# Patient Record
Sex: MALE | Race: WHITE | NOT HISPANIC OR LATINO | URBAN - METROPOLITAN AREA
[De-identification: names, ages, dates, MRNs, and addresses within clinical notes are randomized per-mention and may not be internally consistent; named-entity substitution may affect disease eponyms.]

---

## 2021-01-01 ENCOUNTER — INPATIENT (INPATIENT)
Facility: HOSPITAL | Age: 66
LOS: 1 days | DRG: 871 | End: 2021-07-24
Attending: SURGERY | Admitting: SURGERY
Payer: COMMERCIAL

## 2021-01-01 ENCOUNTER — EMERGENCY (EMERGENCY)
Facility: HOSPITAL | Age: 66
LOS: 1 days | Discharge: HOME | End: 2021-01-01
Attending: STUDENT IN AN ORGANIZED HEALTH CARE EDUCATION/TRAINING PROGRAM
Payer: COMMERCIAL

## 2021-01-01 VITALS
OXYGEN SATURATION: 98 % | SYSTOLIC BLOOD PRESSURE: 161 MMHG | RESPIRATION RATE: 18 BRPM | DIASTOLIC BLOOD PRESSURE: 90 MMHG | HEART RATE: 94 BPM | TEMPERATURE: 99 F

## 2021-01-01 VITALS
HEART RATE: 109 BPM | TEMPERATURE: 101 F | SYSTOLIC BLOOD PRESSURE: 183 MMHG | RESPIRATION RATE: 16 BRPM | WEIGHT: 179.9 LBS | DIASTOLIC BLOOD PRESSURE: 107 MMHG

## 2021-01-01 VITALS
WEIGHT: 177.91 LBS | HEART RATE: 117 BPM | RESPIRATION RATE: 18 BRPM | DIASTOLIC BLOOD PRESSURE: 77 MMHG | TEMPERATURE: 99 F | HEIGHT: 73 IN | OXYGEN SATURATION: 95 % | SYSTOLIC BLOOD PRESSURE: 108 MMHG

## 2021-01-01 VITALS — HEART RATE: 140 BPM

## 2021-01-01 DIAGNOSIS — I76 SEPTIC ARTERIAL EMBOLISM: ICD-10-CM

## 2021-01-01 LAB
-  K. PNEUMONIAE GROUP: SIGNIFICANT CHANGE UP
A1C WITH ESTIMATED AVERAGE GLUCOSE RESULT: 13.4 % — HIGH (ref 4–5.6)
ACETONE SERPL-MCNC: NEGATIVE — SIGNIFICANT CHANGE UP
ALBUMIN SERPL ELPH-MCNC: 0.9 G/DL — LOW (ref 3.5–5)
ALBUMIN SERPL ELPH-MCNC: 1.1 G/DL — LOW (ref 3.5–5)
ALBUMIN SERPL ELPH-MCNC: 1.1 G/DL — LOW (ref 3.5–5)
ALBUMIN SERPL ELPH-MCNC: 1.2 G/DL — LOW (ref 3.5–5)
ALBUMIN SERPL ELPH-MCNC: 1.4 G/DL — LOW (ref 3.5–5)
ALBUMIN SERPL ELPH-MCNC: 1.6 G/DL — LOW (ref 3.5–5)
ALBUMIN SERPL ELPH-MCNC: 1.9 G/DL — LOW (ref 3.5–5)
ALP SERPL-CCNC: 140 U/L — HIGH (ref 40–120)
ALP SERPL-CCNC: 174 U/L — HIGH (ref 40–120)
ALP SERPL-CCNC: 185 U/L — HIGH (ref 40–120)
ALP SERPL-CCNC: 190 U/L — HIGH (ref 40–120)
ALP SERPL-CCNC: 191 U/L — HIGH (ref 40–120)
ALP SERPL-CCNC: 253 U/L — HIGH (ref 40–120)
ALP SERPL-CCNC: 688 U/L — HIGH (ref 40–120)
ALT FLD-CCNC: 123 U/L DA — HIGH (ref 10–60)
ALT FLD-CCNC: 133 U/L DA — HIGH (ref 10–60)
ALT FLD-CCNC: 143 U/L DA — HIGH (ref 10–60)
ALT FLD-CCNC: 2261 U/L DA — HIGH (ref 10–60)
ALT FLD-CCNC: 240 U/L DA — HIGH (ref 10–60)
ALT FLD-CCNC: 417 U/L DA — HIGH (ref 10–60)
ALT FLD-CCNC: 48 U/L DA — SIGNIFICANT CHANGE UP (ref 10–60)
AMPHET UR-MCNC: NEGATIVE — SIGNIFICANT CHANGE UP
ANION GAP SERPL CALC-SCNC: 12 MMOL/L — SIGNIFICANT CHANGE UP (ref 5–17)
ANION GAP SERPL CALC-SCNC: 19 MMOL/L — HIGH (ref 5–17)
ANION GAP SERPL CALC-SCNC: 19 MMOL/L — HIGH (ref 5–17)
ANION GAP SERPL CALC-SCNC: 22 MMOL/L — HIGH (ref 5–17)
ANION GAP SERPL CALC-SCNC: 23 MMOL/L — HIGH (ref 5–17)
ANION GAP SERPL CALC-SCNC: 29 MMOL/L — HIGH (ref 5–17)
ANION GAP SERPL CALC-SCNC: 33 MMOL/L — HIGH (ref 5–17)
ANION GAP SERPL CALC-SCNC: 8 MMOL/L — SIGNIFICANT CHANGE UP (ref 5–17)
APPEARANCE UR: CLEAR — SIGNIFICANT CHANGE UP
APTT BLD: 30.2 SEC — SIGNIFICANT CHANGE UP (ref 27.5–35.5)
APTT BLD: 30.5 SEC — SIGNIFICANT CHANGE UP (ref 27.5–35.5)
AST SERPL-CCNC: 1872 U/L — HIGH (ref 10–40)
AST SERPL-CCNC: 264 U/L — HIGH (ref 10–40)
AST SERPL-CCNC: 267 U/L — HIGH (ref 10–40)
AST SERPL-CCNC: 285 U/L — HIGH (ref 10–40)
AST SERPL-CCNC: 63 U/L — HIGH (ref 10–40)
AST SERPL-CCNC: 803 U/L — HIGH (ref 10–40)
AST SERPL-CCNC: HIGH U/L (ref 10–40)
B PERT DNA SPEC QL NAA+PROBE: SIGNIFICANT CHANGE UP
BARBITURATES UR SCN-MCNC: NEGATIVE — SIGNIFICANT CHANGE UP
BASE EXCESS BLDA CALC-SCNC: -15.5 MMOL/L — LOW (ref -2–3)
BASE EXCESS BLDA CALC-SCNC: -16.1 MMOL/L — LOW (ref -2–3)
BASE EXCESS BLDA CALC-SCNC: -17.4 MMOL/L — LOW (ref -2–3)
BASE EXCESS BLDA CALC-SCNC: -19.4 MMOL/L — LOW (ref -2–3)
BASE EXCESS BLDA CALC-SCNC: SIGNIFICANT CHANGE UP MMOL/L (ref -2–3)
BASE EXCESS BLDA CALC-SCNC: SIGNIFICANT CHANGE UP MMOL/L (ref -2–3)
BASOPHILS # BLD AUTO: 0 K/UL — SIGNIFICANT CHANGE UP (ref 0–0.2)
BASOPHILS # BLD AUTO: 0 K/UL — SIGNIFICANT CHANGE UP (ref 0–0.2)
BASOPHILS # BLD AUTO: 0.03 K/UL — SIGNIFICANT CHANGE UP (ref 0–0.2)
BASOPHILS NFR BLD AUTO: 0 % — SIGNIFICANT CHANGE UP (ref 0–2)
BASOPHILS NFR BLD AUTO: 0 % — SIGNIFICANT CHANGE UP (ref 0–2)
BASOPHILS NFR BLD AUTO: 0.8 % — SIGNIFICANT CHANGE UP (ref 0–2)
BENZODIAZ UR-MCNC: NEGATIVE — SIGNIFICANT CHANGE UP
BILIRUB DIRECT SERPL-MCNC: 1.9 MG/DL — HIGH (ref 0–0.2)
BILIRUB SERPL-MCNC: 1.5 MG/DL — HIGH (ref 0.2–1.2)
BILIRUB SERPL-MCNC: 1.7 MG/DL — HIGH (ref 0.2–1.2)
BILIRUB SERPL-MCNC: 1.8 MG/DL — HIGH (ref 0.2–1.2)
BILIRUB SERPL-MCNC: 1.9 MG/DL — HIGH (ref 0.2–1.2)
BILIRUB SERPL-MCNC: 2.4 MG/DL — HIGH (ref 0.2–1.2)
BILIRUB SERPL-MCNC: 2.5 MG/DL — HIGH (ref 0.2–1.2)
BILIRUB SERPL-MCNC: 3 MG/DL — HIGH (ref 0.2–1.2)
BILIRUB UR-MCNC: ABNORMAL
BLD GP AB SCN SERPL QL: SIGNIFICANT CHANGE UP
BLOOD GAS COMMENTS ARTERIAL: SIGNIFICANT CHANGE UP
BUN SERPL-MCNC: 16 MG/DL — SIGNIFICANT CHANGE UP (ref 7–18)
BUN SERPL-MCNC: 17 MG/DL — SIGNIFICANT CHANGE UP (ref 7–18)
BUN SERPL-MCNC: 40 MG/DL — HIGH (ref 7–18)
BUN SERPL-MCNC: 47 MG/DL — HIGH (ref 7–18)
BUN SERPL-MCNC: 52 MG/DL — HIGH (ref 7–18)
BUN SERPL-MCNC: 57 MG/DL — HIGH (ref 7–18)
BUN SERPL-MCNC: 57 MG/DL — HIGH (ref 7–18)
BUN SERPL-MCNC: 59 MG/DL — HIGH (ref 7–18)
C PNEUM DNA SPEC QL NAA+PROBE: SIGNIFICANT CHANGE UP
CALCIUM SERPL-MCNC: 6.9 MG/DL — LOW (ref 8.4–10.5)
CALCIUM SERPL-MCNC: 7.5 MG/DL — LOW (ref 8.4–10.5)
CALCIUM SERPL-MCNC: 7.7 MG/DL — LOW (ref 8.4–10.5)
CALCIUM SERPL-MCNC: 7.8 MG/DL — LOW (ref 8.4–10.5)
CALCIUM SERPL-MCNC: 7.8 MG/DL — LOW (ref 8.4–10.5)
CALCIUM SERPL-MCNC: 7.9 MG/DL — LOW (ref 8.4–10.5)
CALCIUM SERPL-MCNC: 8.1 MG/DL — LOW (ref 8.4–10.5)
CALCIUM SERPL-MCNC: 8.3 MG/DL — LOW (ref 8.4–10.5)
CHLORIDE SERPL-SCNC: 90 MMOL/L — LOW (ref 96–108)
CHLORIDE SERPL-SCNC: 92 MMOL/L — LOW (ref 96–108)
CHLORIDE SERPL-SCNC: 92 MMOL/L — LOW (ref 96–108)
CHLORIDE SERPL-SCNC: 94 MMOL/L — LOW (ref 96–108)
CHLORIDE SERPL-SCNC: 96 MMOL/L — SIGNIFICANT CHANGE UP (ref 96–108)
CHLORIDE SERPL-SCNC: 98 MMOL/L — SIGNIFICANT CHANGE UP (ref 96–108)
CHLORIDE SERPL-SCNC: 98 MMOL/L — SIGNIFICANT CHANGE UP (ref 96–108)
CHLORIDE SERPL-SCNC: 99 MMOL/L — SIGNIFICANT CHANGE UP (ref 96–108)
CHOLEST SERPL-MCNC: 68 MG/DL — SIGNIFICANT CHANGE UP
CK SERPL-CCNC: 53 U/L — SIGNIFICANT CHANGE UP (ref 35–232)
CO2 SERPL-SCNC: 11 MMOL/L — LOW (ref 22–31)
CO2 SERPL-SCNC: 16 MMOL/L — LOW (ref 22–31)
CO2 SERPL-SCNC: 17 MMOL/L — LOW (ref 22–31)
CO2 SERPL-SCNC: 17 MMOL/L — LOW (ref 22–31)
CO2 SERPL-SCNC: 19 MMOL/L — LOW (ref 22–31)
CO2 SERPL-SCNC: 24 MMOL/L — SIGNIFICANT CHANGE UP (ref 22–31)
CO2 SERPL-SCNC: 25 MMOL/L — SIGNIFICANT CHANGE UP (ref 22–31)
CO2 SERPL-SCNC: 9 MMOL/L — CRITICAL LOW (ref 22–31)
COCAINE METAB.OTHER UR-MCNC: NEGATIVE — SIGNIFICANT CHANGE UP
COLOR SPEC: ABNORMAL
COVID-19 SPIKE DOMAIN AB INTERP: POSITIVE
COVID-19 SPIKE DOMAIN ANTIBODY RESULT: >250 U/ML — HIGH
CREAT SERPL-MCNC: 0.98 MG/DL — SIGNIFICANT CHANGE UP (ref 0.5–1.3)
CREAT SERPL-MCNC: 1 MG/DL — SIGNIFICANT CHANGE UP (ref 0.5–1.3)
CREAT SERPL-MCNC: 2.05 MG/DL — HIGH (ref 0.5–1.3)
CREAT SERPL-MCNC: 2.17 MG/DL — HIGH (ref 0.5–1.3)
CREAT SERPL-MCNC: 2.27 MG/DL — HIGH (ref 0.5–1.3)
CREAT SERPL-MCNC: 2.71 MG/DL — HIGH (ref 0.5–1.3)
CREAT SERPL-MCNC: 3.15 MG/DL — HIGH (ref 0.5–1.3)
CREAT SERPL-MCNC: 4.04 MG/DL — HIGH (ref 0.5–1.3)
CRP SERPL-MCNC: 264 MG/L — HIGH
D DIMER BLD IA.RAPID-MCNC: 1205 NG/ML DDU — HIGH
DIFF PNL FLD: ABNORMAL
EOSINOPHIL # BLD AUTO: 0 K/UL — SIGNIFICANT CHANGE UP (ref 0–0.5)
EOSINOPHIL NFR BLD AUTO: 0 % — SIGNIFICANT CHANGE UP (ref 0–6)
ESTIMATED AVERAGE GLUCOSE: 338 MG/DL — HIGH (ref 68–114)
FLUAV H1 2009 PAND RNA SPEC QL NAA+PROBE: SIGNIFICANT CHANGE UP
FLUAV H1 RNA SPEC QL NAA+PROBE: SIGNIFICANT CHANGE UP
FLUAV H3 RNA SPEC QL NAA+PROBE: SIGNIFICANT CHANGE UP
FLUAV SUBTYP SPEC NAA+PROBE: SIGNIFICANT CHANGE UP
FLUBV RNA SPEC QL NAA+PROBE: SIGNIFICANT CHANGE UP
GLUCOSE SERPL-MCNC: 104 MG/DL — HIGH (ref 70–99)
GLUCOSE SERPL-MCNC: 177 MG/DL — HIGH (ref 70–99)
GLUCOSE SERPL-MCNC: 265 MG/DL — HIGH (ref 70–99)
GLUCOSE SERPL-MCNC: 326 MG/DL — HIGH (ref 70–99)
GLUCOSE SERPL-MCNC: 359 MG/DL — HIGH (ref 70–99)
GLUCOSE SERPL-MCNC: 415 MG/DL — HIGH (ref 70–99)
GLUCOSE SERPL-MCNC: 440 MG/DL — HIGH (ref 70–99)
GLUCOSE SERPL-MCNC: 55 MG/DL — LOW (ref 70–99)
GLUCOSE UR QL: 100 MG/DL
GRAM STN FLD: SIGNIFICANT CHANGE UP
GRAM STN FLD: SIGNIFICANT CHANGE UP
HADV DNA SPEC QL NAA+PROBE: SIGNIFICANT CHANGE UP
HAV IGM SER-ACNC: SIGNIFICANT CHANGE UP
HBV CORE IGM SER-ACNC: SIGNIFICANT CHANGE UP
HBV SURFACE AG SER-ACNC: SIGNIFICANT CHANGE UP
HCO3 BLDA-SCNC: 10 MMOL/L — CRITICAL LOW (ref 21–28)
HCO3 BLDA-SCNC: 11 MMOL/L — LOW (ref 21–28)
HCO3 BLDA-SCNC: 13 MMOL/L — LOW (ref 21–28)
HCO3 BLDA-SCNC: 13 MMOL/L — LOW (ref 21–28)
HCO3 BLDA-SCNC: SIGNIFICANT CHANGE UP MMOL/L (ref 21–28)
HCO3 BLDA-SCNC: SIGNIFICANT CHANGE UP MMOL/L (ref 21–28)
HCOV PNL SPEC NAA+PROBE: SIGNIFICANT CHANGE UP
HCT VFR BLD CALC: 40.4 % — SIGNIFICANT CHANGE UP (ref 39–50)
HCT VFR BLD CALC: 40.7 % — SIGNIFICANT CHANGE UP (ref 39–50)
HCT VFR BLD CALC: 43.2 % — SIGNIFICANT CHANGE UP (ref 39–50)
HCT VFR BLD CALC: 43.5 % — SIGNIFICANT CHANGE UP (ref 39–50)
HCT VFR BLD CALC: 46.3 % — SIGNIFICANT CHANGE UP (ref 39–50)
HCT VFR BLD CALC: 47.6 % — SIGNIFICANT CHANGE UP (ref 39–50)
HCT VFR BLD CALC: 48.4 % — SIGNIFICANT CHANGE UP (ref 39–50)
HCV AB S/CO SERPL IA: 0.09 S/CO — SIGNIFICANT CHANGE UP (ref 0–0.99)
HCV AB SERPL-IMP: SIGNIFICANT CHANGE UP
HDLC SERPL-MCNC: 12 MG/DL — LOW
HGB BLD-MCNC: 12.9 G/DL — LOW (ref 13–17)
HGB BLD-MCNC: 13.5 G/DL — SIGNIFICANT CHANGE UP (ref 13–17)
HGB BLD-MCNC: 13.5 G/DL — SIGNIFICANT CHANGE UP (ref 13–17)
HGB BLD-MCNC: 14 G/DL — SIGNIFICANT CHANGE UP (ref 13–17)
HGB BLD-MCNC: 14.2 G/DL — SIGNIFICANT CHANGE UP (ref 13–17)
HGB BLD-MCNC: 14.7 G/DL — SIGNIFICANT CHANGE UP (ref 13–17)
HGB BLD-MCNC: 14.7 G/DL — SIGNIFICANT CHANGE UP (ref 13–17)
HMPV RNA SPEC QL NAA+PROBE: SIGNIFICANT CHANGE UP
HOROWITZ INDEX BLDA+IHG-RTO: 100 — SIGNIFICANT CHANGE UP
HOROWITZ INDEX BLDA+IHG-RTO: 35 — SIGNIFICANT CHANGE UP
HOROWITZ INDEX BLDA+IHG-RTO: SIGNIFICANT CHANGE UP
HPIV1 RNA SPEC QL NAA+PROBE: SIGNIFICANT CHANGE UP
HPIV2 RNA SPEC QL NAA+PROBE: SIGNIFICANT CHANGE UP
HPIV3 RNA SPEC QL NAA+PROBE: SIGNIFICANT CHANGE UP
HPIV4 RNA SPEC QL NAA+PROBE: SIGNIFICANT CHANGE UP
IMM GRANULOCYTES NFR BLD AUTO: 0 % — SIGNIFICANT CHANGE UP (ref 0–1.5)
INR BLD: 1.43 RATIO — HIGH (ref 0.88–1.16)
INR BLD: 1.55 RATIO — HIGH (ref 0.88–1.16)
KETONES UR-MCNC: ABNORMAL
LACTATE SERPL-SCNC: 10.9 MMOL/L — CRITICAL HIGH (ref 0.7–2)
LACTATE SERPL-SCNC: 11.5 MMOL/L — CRITICAL HIGH (ref 0.7–2)
LACTATE SERPL-SCNC: 11.6 MMOL/L — CRITICAL HIGH (ref 0.7–2)
LACTATE SERPL-SCNC: 14.5 MMOL/L — CRITICAL HIGH (ref 0.7–2)
LACTATE SERPL-SCNC: 14.9 MMOL/L — CRITICAL HIGH (ref 0.7–2)
LACTATE SERPL-SCNC: 20.9 MMOL/L — CRITICAL HIGH (ref 0.7–2)
LACTATE SERPL-SCNC: 24.8 MMOL/L — CRITICAL HIGH (ref 0.7–2)
LEGIONELLA AG UR QL: NEGATIVE — SIGNIFICANT CHANGE UP
LEUKOCYTE ESTERASE UR-ACNC: ABNORMAL
LIPID PNL WITH DIRECT LDL SERPL: 29 MG/DL — SIGNIFICANT CHANGE UP
LYMPHOCYTES # BLD AUTO: 0.32 K/UL — LOW (ref 1–3.3)
LYMPHOCYTES # BLD AUTO: 0.33 K/UL — LOW (ref 1–3.3)
LYMPHOCYTES # BLD AUTO: 0.33 K/UL — LOW (ref 1–3.3)
LYMPHOCYTES # BLD AUTO: 2 % — LOW (ref 13–44)
LYMPHOCYTES # BLD AUTO: 3 % — LOW (ref 13–44)
LYMPHOCYTES # BLD AUTO: 8.7 % — LOW (ref 13–44)
MAGNESIUM SERPL-MCNC: 1.8 MG/DL — SIGNIFICANT CHANGE UP (ref 1.6–2.6)
MAGNESIUM SERPL-MCNC: 2.1 MG/DL — SIGNIFICANT CHANGE UP (ref 1.6–2.6)
MAGNESIUM SERPL-MCNC: 2.2 MG/DL — SIGNIFICANT CHANGE UP (ref 1.6–2.6)
MAGNESIUM SERPL-MCNC: 2.4 MG/DL — SIGNIFICANT CHANGE UP (ref 1.6–2.6)
MAGNESIUM SERPL-MCNC: 2.7 MG/DL — HIGH (ref 1.6–2.6)
MAGNESIUM SERPL-MCNC: 3.2 MG/DL — HIGH (ref 1.6–2.6)
MANUAL SMEAR VERIFICATION: SIGNIFICANT CHANGE UP
MCHC RBC-ENTMCNC: 27.6 PG — SIGNIFICANT CHANGE UP (ref 27–34)
MCHC RBC-ENTMCNC: 27.7 PG — SIGNIFICANT CHANGE UP (ref 27–34)
MCHC RBC-ENTMCNC: 27.7 PG — SIGNIFICANT CHANGE UP (ref 27–34)
MCHC RBC-ENTMCNC: 27.9 PG — SIGNIFICANT CHANGE UP (ref 27–34)
MCHC RBC-ENTMCNC: 28.1 PG — SIGNIFICANT CHANGE UP (ref 27–34)
MCHC RBC-ENTMCNC: 29.2 GM/DL — LOW (ref 32–36)
MCHC RBC-ENTMCNC: 30.4 GM/DL — LOW (ref 32–36)
MCHC RBC-ENTMCNC: 30.9 GM/DL — LOW (ref 32–36)
MCHC RBC-ENTMCNC: 31.9 GM/DL — LOW (ref 32–36)
MCHC RBC-ENTMCNC: 32.2 GM/DL — SIGNIFICANT CHANGE UP (ref 32–36)
MCHC RBC-ENTMCNC: 32.9 GM/DL — SIGNIFICANT CHANGE UP (ref 32–36)
MCHC RBC-ENTMCNC: 33.2 GM/DL — SIGNIFICANT CHANGE UP (ref 32–36)
MCV RBC AUTO: 84.1 FL — SIGNIFICANT CHANGE UP (ref 80–100)
MCV RBC AUTO: 84.4 FL — SIGNIFICANT CHANGE UP (ref 80–100)
MCV RBC AUTO: 86.8 FL — SIGNIFICANT CHANGE UP (ref 80–100)
MCV RBC AUTO: 87.4 FL — SIGNIFICANT CHANGE UP (ref 80–100)
MCV RBC AUTO: 90.8 FL — SIGNIFICANT CHANGE UP (ref 80–100)
MCV RBC AUTO: 91.1 FL — SIGNIFICANT CHANGE UP (ref 80–100)
MCV RBC AUTO: 94.5 FL — SIGNIFICANT CHANGE UP (ref 80–100)
METHADONE UR-MCNC: NEGATIVE — SIGNIFICANT CHANGE UP
METHOD TYPE: SIGNIFICANT CHANGE UP
MONOCYTES # BLD AUTO: 0.23 K/UL — SIGNIFICANT CHANGE UP (ref 0–0.9)
MONOCYTES # BLD AUTO: 0.77 K/UL — SIGNIFICANT CHANGE UP (ref 0–0.9)
MONOCYTES # BLD AUTO: 0.8 K/UL — SIGNIFICANT CHANGE UP (ref 0–0.9)
MONOCYTES NFR BLD AUTO: 5 % — SIGNIFICANT CHANGE UP (ref 2–14)
MONOCYTES NFR BLD AUTO: 6.1 % — SIGNIFICANT CHANGE UP (ref 2–14)
MONOCYTES NFR BLD AUTO: 7 % — SIGNIFICANT CHANGE UP (ref 2–14)
MRSA PCR RESULT.: SIGNIFICANT CHANGE UP
NEUTROPHILS # BLD AUTO: 14.87 K/UL — HIGH (ref 1.8–7.4)
NEUTROPHILS # BLD AUTO: 3.21 K/UL — SIGNIFICANT CHANGE UP (ref 1.8–7.4)
NEUTROPHILS # BLD AUTO: 9.04 K/UL — HIGH (ref 1.8–7.4)
NEUTROPHILS NFR BLD AUTO: 82 % — HIGH (ref 43–77)
NEUTROPHILS NFR BLD AUTO: 82 % — HIGH (ref 43–77)
NEUTROPHILS NFR BLD AUTO: 84.4 % — HIGH (ref 43–77)
NITRITE UR-MCNC: POSITIVE
NON HDL CHOLESTEROL: 56 MG/DL — SIGNIFICANT CHANGE UP
NRBC # BLD: 0 /100 WBCS — SIGNIFICANT CHANGE UP (ref 0–0)
NRBC # BLD: 0 /100 — SIGNIFICANT CHANGE UP (ref 0–0)
NRBC # BLD: 1 /100 WBCS — HIGH (ref 0–0)
OPIATES UR-MCNC: POSITIVE
PCO2 BLDA: 29 MMHG — LOW (ref 35–48)
PCO2 BLDA: 31 MMHG — LOW (ref 35–48)
PCO2 BLDA: 37 MMHG — SIGNIFICANT CHANGE UP (ref 35–48)
PCO2 BLDA: 41 MMHG — SIGNIFICANT CHANGE UP (ref 35–48)
PCO2 BLDA: 42 MMHG — SIGNIFICANT CHANGE UP (ref 35–48)
PCO2 BLDA: 43 MMHG — SIGNIFICANT CHANGE UP (ref 35–48)
PCP SPEC-MCNC: SIGNIFICANT CHANGE UP
PCP UR-MCNC: NEGATIVE — SIGNIFICANT CHANGE UP
PH BLDA: 7.02 — CRITICAL LOW (ref 7.35–7.45)
PH BLDA: 7.1 — CRITICAL LOW (ref 7.35–7.45)
PH BLDA: 7.12 — CRITICAL LOW (ref 7.35–7.45)
PH BLDA: 7.15 — CRITICAL LOW (ref 7.35–7.45)
PH BLDA: <7 — SIGNIFICANT CHANGE UP (ref 7.35–7.45)
PH BLDA: <7 — SIGNIFICANT CHANGE UP (ref 7.35–7.45)
PH UR: 5 — SIGNIFICANT CHANGE UP (ref 5–8)
PHOSPHATE SERPL-MCNC: 15.3 MG/DL — SIGNIFICANT CHANGE UP (ref 2.5–4.5)
PHOSPHATE SERPL-MCNC: 4.9 MG/DL — HIGH (ref 2.5–4.5)
PHOSPHATE SERPL-MCNC: 6.9 MG/DL — HIGH (ref 2.5–4.5)
PHOSPHATE SERPL-MCNC: 8.7 MG/DL — HIGH (ref 2.5–4.5)
PLAT MORPH BLD: NORMAL — SIGNIFICANT CHANGE UP
PLATELET # BLD AUTO: 133 K/UL — LOW (ref 150–400)
PLATELET # BLD AUTO: 140 K/UL — LOW (ref 150–400)
PLATELET # BLD AUTO: 160 K/UL — SIGNIFICANT CHANGE UP (ref 150–400)
PLATELET # BLD AUTO: 171 K/UL — SIGNIFICANT CHANGE UP (ref 150–400)
PLATELET # BLD AUTO: 184 K/UL — SIGNIFICANT CHANGE UP (ref 150–400)
PLATELET # BLD AUTO: 185 K/UL — SIGNIFICANT CHANGE UP (ref 150–400)
PLATELET # BLD AUTO: 83 K/UL — LOW (ref 150–400)
PO2 BLDA: 63 MMHG — LOW (ref 83–108)
PO2 BLDA: 70 MMHG — LOW (ref 83–108)
PO2 BLDA: 72 MMHG — LOW (ref 83–108)
PO2 BLDA: 72 MMHG — LOW (ref 83–108)
PO2 BLDA: 73 MMHG — LOW (ref 83–108)
PO2 BLDA: 79 MMHG — LOW (ref 83–108)
POTASSIUM SERPL-MCNC: 3.5 MMOL/L — SIGNIFICANT CHANGE UP (ref 3.5–5.3)
POTASSIUM SERPL-MCNC: 3.6 MMOL/L — SIGNIFICANT CHANGE UP (ref 3.5–5.3)
POTASSIUM SERPL-MCNC: 3.7 MMOL/L — SIGNIFICANT CHANGE UP (ref 3.5–5.3)
POTASSIUM SERPL-MCNC: 4.1 MMOL/L — SIGNIFICANT CHANGE UP (ref 3.5–5.3)
POTASSIUM SERPL-MCNC: 4.1 MMOL/L — SIGNIFICANT CHANGE UP (ref 3.5–5.3)
POTASSIUM SERPL-MCNC: 4.2 MMOL/L — SIGNIFICANT CHANGE UP (ref 3.5–5.3)
POTASSIUM SERPL-MCNC: 4.4 MMOL/L — SIGNIFICANT CHANGE UP (ref 3.5–5.3)
POTASSIUM SERPL-MCNC: 6.6 MMOL/L — CRITICAL HIGH (ref 3.5–5.3)
POTASSIUM SERPL-SCNC: 3.5 MMOL/L — SIGNIFICANT CHANGE UP (ref 3.5–5.3)
POTASSIUM SERPL-SCNC: 3.6 MMOL/L — SIGNIFICANT CHANGE UP (ref 3.5–5.3)
POTASSIUM SERPL-SCNC: 3.7 MMOL/L — SIGNIFICANT CHANGE UP (ref 3.5–5.3)
POTASSIUM SERPL-SCNC: 4.1 MMOL/L — SIGNIFICANT CHANGE UP (ref 3.5–5.3)
POTASSIUM SERPL-SCNC: 4.1 MMOL/L — SIGNIFICANT CHANGE UP (ref 3.5–5.3)
POTASSIUM SERPL-SCNC: 4.2 MMOL/L — SIGNIFICANT CHANGE UP (ref 3.5–5.3)
POTASSIUM SERPL-SCNC: 4.4 MMOL/L — SIGNIFICANT CHANGE UP (ref 3.5–5.3)
POTASSIUM SERPL-SCNC: 6.6 MMOL/L — CRITICAL HIGH (ref 3.5–5.3)
PROT SERPL-MCNC: 4 G/DL — LOW (ref 6–8.3)
PROT SERPL-MCNC: 4.6 G/DL — LOW (ref 6–8.3)
PROT SERPL-MCNC: 4.8 G/DL — LOW (ref 6–8.3)
PROT SERPL-MCNC: 5.2 G/DL — LOW (ref 6–8.3)
PROT SERPL-MCNC: 5.6 G/DL — LOW (ref 6–8.3)
PROT SERPL-MCNC: 6.5 G/DL — SIGNIFICANT CHANGE UP (ref 6–8.3)
PROT SERPL-MCNC: 6.7 G/DL — SIGNIFICANT CHANGE UP (ref 6–8.3)
PROT UR-MCNC: 30 MG/DL
PROTHROM AB SERPL-ACNC: 16.7 SEC — HIGH (ref 10.6–13.6)
PROTHROM AB SERPL-ACNC: 18.1 SEC — HIGH (ref 10.6–13.6)
RAPID RVP RESULT: DETECTED
RBC # BLD: 4.62 M/UL — SIGNIFICANT CHANGE UP (ref 4.2–5.8)
RBC # BLD: 4.84 M/UL — SIGNIFICANT CHANGE UP (ref 4.2–5.8)
RBC # BLD: 4.9 M/UL — SIGNIFICANT CHANGE UP (ref 4.2–5.8)
RBC # BLD: 5.01 M/UL — SIGNIFICANT CHANGE UP (ref 4.2–5.8)
RBC # BLD: 5.12 M/UL — SIGNIFICANT CHANGE UP (ref 4.2–5.8)
RBC # BLD: 5.24 M/UL — SIGNIFICANT CHANGE UP (ref 4.2–5.8)
RBC # BLD: 5.31 M/UL — SIGNIFICANT CHANGE UP (ref 4.2–5.8)
RBC # FLD: 12.7 % — SIGNIFICANT CHANGE UP (ref 10.3–14.5)
RBC # FLD: 13.5 % — SIGNIFICANT CHANGE UP (ref 10.3–14.5)
RBC # FLD: 14 % — SIGNIFICANT CHANGE UP (ref 10.3–14.5)
RBC # FLD: 14.2 % — SIGNIFICANT CHANGE UP (ref 10.3–14.5)
RBC # FLD: 14.5 % — SIGNIFICANT CHANGE UP (ref 10.3–14.5)
RBC # FLD: 14.9 % — HIGH (ref 10.3–14.5)
RBC # FLD: 15.2 % — HIGH (ref 10.3–14.5)
RBC BLD AUTO: NORMAL — SIGNIFICANT CHANGE UP
RSV RNA SPEC QL NAA+PROBE: DETECTED
RV+EV RNA SPEC QL NAA+PROBE: SIGNIFICANT CHANGE UP
S AUREUS DNA NOSE QL NAA+PROBE: DETECTED
SAO2 % BLDA: 90 % — SIGNIFICANT CHANGE UP
SAO2 % BLDA: 92 % — SIGNIFICANT CHANGE UP
SAO2 % BLDA: 92 % — SIGNIFICANT CHANGE UP
SAO2 % BLDA: 93 % — SIGNIFICANT CHANGE UP
SAO2 % BLDA: 93 % — SIGNIFICANT CHANGE UP
SAO2 % BLDA: 95 % — SIGNIFICANT CHANGE UP
SARS-COV-2 IGG+IGM SERPL QL IA: >250 U/ML — HIGH
SARS-COV-2 IGG+IGM SERPL QL IA: POSITIVE
SARS-COV-2 RNA SPEC QL NAA+PROBE: SIGNIFICANT CHANGE UP
SODIUM SERPL-SCNC: 126 MMOL/L — LOW (ref 135–145)
SODIUM SERPL-SCNC: 128 MMOL/L — LOW (ref 135–145)
SODIUM SERPL-SCNC: 128 MMOL/L — LOW (ref 135–145)
SODIUM SERPL-SCNC: 129 MMOL/L — LOW (ref 135–145)
SODIUM SERPL-SCNC: 137 MMOL/L — SIGNIFICANT CHANGE UP (ref 135–145)
SODIUM SERPL-SCNC: 138 MMOL/L — SIGNIFICANT CHANGE UP (ref 135–145)
SP GR SPEC: 1.02 — SIGNIFICANT CHANGE UP (ref 1.01–1.02)
SPECIMEN SOURCE: SIGNIFICANT CHANGE UP
SPECIMEN SOURCE: SIGNIFICANT CHANGE UP
THC UR QL: NEGATIVE — SIGNIFICANT CHANGE UP
TRIGL SERPL-MCNC: 133 MG/DL — SIGNIFICANT CHANGE UP
TROPONIN I SERPL-MCNC: 0.04 NG/ML — SIGNIFICANT CHANGE UP (ref 0–0.04)
TROPONIN I SERPL-MCNC: 0.28 NG/ML — HIGH (ref 0–0.04)
TROPONIN I SERPL-MCNC: <0.015 NG/ML — SIGNIFICANT CHANGE UP (ref 0–0.04)
UROBILINOGEN FLD QL: 4
VARIANT LYMPHS # BLD: 8 % — HIGH (ref 0–6)
VIT B12 SERPL-MCNC: >2000 PG/ML — HIGH (ref 232–1245)
WBC # BLD: 11.03 K/UL — HIGH (ref 3.8–10.5)
WBC # BLD: 15.99 K/UL — HIGH (ref 3.8–10.5)
WBC # BLD: 2.66 K/UL — LOW (ref 3.8–10.5)
WBC # BLD: 3.37 K/UL — LOW (ref 3.8–10.5)
WBC # BLD: 3.8 K/UL — SIGNIFICANT CHANGE UP (ref 3.8–10.5)
WBC # BLD: 4.56 K/UL — SIGNIFICANT CHANGE UP (ref 3.8–10.5)
WBC # BLD: 8.17 K/UL — SIGNIFICANT CHANGE UP (ref 3.8–10.5)
WBC # FLD AUTO: 11.03 K/UL — HIGH (ref 3.8–10.5)
WBC # FLD AUTO: 15.99 K/UL — HIGH (ref 3.8–10.5)
WBC # FLD AUTO: 2.66 K/UL — LOW (ref 3.8–10.5)
WBC # FLD AUTO: 3.37 K/UL — LOW (ref 3.8–10.5)
WBC # FLD AUTO: 3.8 K/UL — SIGNIFICANT CHANGE UP (ref 3.8–10.5)
WBC # FLD AUTO: 4.56 K/UL — SIGNIFICANT CHANGE UP (ref 3.8–10.5)
WBC # FLD AUTO: 8.17 K/UL — SIGNIFICANT CHANGE UP (ref 3.8–10.5)

## 2021-01-01 PROCEDURE — 94003 VENT MGMT INPAT SUBQ DAY: CPT

## 2021-01-01 PROCEDURE — 71045 X-RAY EXAM CHEST 1 VIEW: CPT | Mod: 26

## 2021-01-01 PROCEDURE — C1769: CPT

## 2021-01-01 PROCEDURE — 99053 MED SERV 10PM-8AM 24 HR FAC: CPT

## 2021-01-01 PROCEDURE — 99291 CRITICAL CARE FIRST HOUR: CPT

## 2021-01-01 PROCEDURE — 80307 DRUG TEST PRSMV CHEM ANLYZR: CPT

## 2021-01-01 PROCEDURE — 87641 MR-STAPH DNA AMP PROBE: CPT

## 2021-01-01 PROCEDURE — 87186 SC STD MICRODIL/AGAR DIL: CPT

## 2021-01-01 PROCEDURE — 36415 COLL VENOUS BLD VENIPUNCTURE: CPT

## 2021-01-01 PROCEDURE — 80048 BASIC METABOLIC PNL TOTAL CA: CPT

## 2021-01-01 PROCEDURE — 74178 CT ABD&PLV WO CNTR FLWD CNTR: CPT | Mod: 26

## 2021-01-01 PROCEDURE — 71046 X-RAY EXAM CHEST 2 VIEWS: CPT

## 2021-01-01 PROCEDURE — 86769 SARS-COV-2 COVID-19 ANTIBODY: CPT

## 2021-01-01 PROCEDURE — 96375 TX/PRO/DX INJ NEW DRUG ADDON: CPT

## 2021-01-01 PROCEDURE — 49405 IMAGE CATH FLUID COLXN VISC: CPT

## 2021-01-01 PROCEDURE — 82009 KETONE BODYS QUAL: CPT

## 2021-01-01 PROCEDURE — 82962 GLUCOSE BLOOD TEST: CPT

## 2021-01-01 PROCEDURE — 93010 ELECTROCARDIOGRAM REPORT: CPT

## 2021-01-01 PROCEDURE — 71275 CT ANGIOGRAPHY CHEST: CPT | Mod: MA

## 2021-01-01 PROCEDURE — 71046 X-RAY EXAM CHEST 2 VIEWS: CPT | Mod: 26

## 2021-01-01 PROCEDURE — 87449 NOS EACH ORGANISM AG IA: CPT

## 2021-01-01 PROCEDURE — 82248 BILIRUBIN DIRECT: CPT

## 2021-01-01 PROCEDURE — 99253 IP/OBS CNSLTJ NEW/EST LOW 45: CPT

## 2021-01-01 PROCEDURE — 87635 SARS-COV-2 COVID-19 AMP PRB: CPT

## 2021-01-01 PROCEDURE — 74176 CT ABD & PELVIS W/O CONTRAST: CPT | Mod: 26,MA

## 2021-01-01 PROCEDURE — 83036 HEMOGLOBIN GLYCOSYLATED A1C: CPT

## 2021-01-01 PROCEDURE — 70450 CT HEAD/BRAIN W/O DYE: CPT | Mod: MA

## 2021-01-01 PROCEDURE — 0225U NFCT DS DNA&RNA 21 SARSCOV2: CPT

## 2021-01-01 PROCEDURE — 87538 HIV-2 PROBE&REVRSE TRNSCRIPJ: CPT

## 2021-01-01 PROCEDURE — 87086 URINE CULTURE/COLONY COUNT: CPT

## 2021-01-01 PROCEDURE — 87150 DNA/RNA AMPLIFIED PROBE: CPT

## 2021-01-01 PROCEDURE — 85610 PROTHROMBIN TIME: CPT

## 2021-01-01 PROCEDURE — 83735 ASSAY OF MAGNESIUM: CPT

## 2021-01-01 PROCEDURE — 86140 C-REACTIVE PROTEIN: CPT

## 2021-01-01 PROCEDURE — 80074 ACUTE HEPATITIS PANEL: CPT

## 2021-01-01 PROCEDURE — 80061 LIPID PANEL: CPT

## 2021-01-01 PROCEDURE — 87640 STAPH A DNA AMP PROBE: CPT

## 2021-01-01 PROCEDURE — 86900 BLOOD TYPING SEROLOGIC ABO: CPT

## 2021-01-01 PROCEDURE — 80053 COMPREHEN METABOLIC PANEL: CPT

## 2021-01-01 PROCEDURE — 85025 COMPLETE CBC W/AUTO DIFF WBC: CPT

## 2021-01-01 PROCEDURE — C1729: CPT

## 2021-01-01 PROCEDURE — 87070 CULTURE OTHR SPECIMN AEROBIC: CPT

## 2021-01-01 PROCEDURE — 87205 SMEAR GRAM STAIN: CPT

## 2021-01-01 PROCEDURE — 93005 ELECTROCARDIOGRAM TRACING: CPT

## 2021-01-01 PROCEDURE — 85379 FIBRIN DEGRADATION QUANT: CPT

## 2021-01-01 PROCEDURE — 81001 URINALYSIS AUTO W/SCOPE: CPT

## 2021-01-01 PROCEDURE — 70450 CT HEAD/BRAIN W/O DYE: CPT | Mod: 26,MA

## 2021-01-01 PROCEDURE — 74176 CT ABD & PELVIS W/O CONTRAST: CPT | Mod: MA

## 2021-01-01 PROCEDURE — 96374 THER/PROPH/DIAG INJ IV PUSH: CPT | Mod: XU

## 2021-01-01 PROCEDURE — 85730 THROMBOPLASTIN TIME PARTIAL: CPT

## 2021-01-01 PROCEDURE — 87040 BLOOD CULTURE FOR BACTERIA: CPT

## 2021-01-01 PROCEDURE — 87077 CULTURE AEROBIC IDENTIFY: CPT

## 2021-01-01 PROCEDURE — 82550 ASSAY OF CK (CPK): CPT

## 2021-01-01 PROCEDURE — 82803 BLOOD GASES ANY COMBINATION: CPT

## 2021-01-01 PROCEDURE — 82607 VITAMIN B-12: CPT

## 2021-01-01 PROCEDURE — 94002 VENT MGMT INPAT INIT DAY: CPT

## 2021-01-01 PROCEDURE — 71275 CT ANGIOGRAPHY CHEST: CPT | Mod: 26,MA

## 2021-01-01 PROCEDURE — 74178 CT ABD&PLV WO CNTR FLWD CNTR: CPT

## 2021-01-01 PROCEDURE — 85027 COMPLETE CBC AUTOMATED: CPT

## 2021-01-01 PROCEDURE — 83605 ASSAY OF LACTIC ACID: CPT

## 2021-01-01 PROCEDURE — 84484 ASSAY OF TROPONIN QUANT: CPT

## 2021-01-01 PROCEDURE — 99285 EMERGENCY DEPT VISIT HI MDM: CPT

## 2021-01-01 PROCEDURE — 84100 ASSAY OF PHOSPHORUS: CPT

## 2021-01-01 PROCEDURE — 86850 RBC ANTIBODY SCREEN: CPT

## 2021-01-01 PROCEDURE — 71045 X-RAY EXAM CHEST 1 VIEW: CPT

## 2021-01-01 PROCEDURE — 86901 BLOOD TYPING SEROLOGIC RH(D): CPT

## 2021-01-01 PROCEDURE — 99284 EMERGENCY DEPT VISIT MOD MDM: CPT | Mod: 25

## 2021-01-01 RX ORDER — DEXTROSE 50 % IN WATER 50 %
50 SYRINGE (ML) INTRAVENOUS ONCE
Refills: 0 | Status: COMPLETED | OUTPATIENT
Start: 2021-01-01 | End: 2021-01-01

## 2021-01-01 RX ORDER — DEXTROSE MONOHYDRATE, SODIUM CHLORIDE, AND POTASSIUM CHLORIDE 50; .745; 4.5 G/1000ML; G/1000ML; G/1000ML
1000 INJECTION, SOLUTION INTRAVENOUS
Refills: 0 | Status: DISCONTINUED | OUTPATIENT
Start: 2021-01-01 | End: 2021-01-01

## 2021-01-01 RX ORDER — CHLORHEXIDINE GLUCONATE 213 G/1000ML
1 SOLUTION TOPICAL DAILY
Refills: 0 | Status: DISCONTINUED | OUTPATIENT
Start: 2021-01-01 | End: 2021-01-01

## 2021-01-01 RX ORDER — NOREPINEPHRINE BITARTRATE/D5W 8 MG/250ML
0.05 PLASTIC BAG, INJECTION (ML) INTRAVENOUS
Qty: 32 | Refills: 0 | Status: DISCONTINUED | OUTPATIENT
Start: 2021-01-01 | End: 2021-01-01

## 2021-01-01 RX ORDER — VANCOMYCIN HCL 1 G
1000 VIAL (EA) INTRAVENOUS ONCE
Refills: 0 | Status: COMPLETED | OUTPATIENT
Start: 2021-01-01 | End: 2021-01-01

## 2021-01-01 RX ORDER — PIPERACILLIN AND TAZOBACTAM 4; .5 G/20ML; G/20ML
3.38 INJECTION, POWDER, LYOPHILIZED, FOR SOLUTION INTRAVENOUS ONCE
Refills: 0 | Status: COMPLETED | OUTPATIENT
Start: 2021-01-01 | End: 2021-01-01

## 2021-01-01 RX ORDER — SODIUM BICARBONATE 1 MEQ/ML
0.22 SYRINGE (ML) INTRAVENOUS
Qty: 150 | Refills: 0 | Status: DISCONTINUED | OUTPATIENT
Start: 2021-01-01 | End: 2021-01-01

## 2021-01-01 RX ORDER — VASOPRESSIN 20 [USP'U]/ML
0.04 INJECTION INTRAVENOUS
Qty: 50 | Refills: 0 | Status: DISCONTINUED | OUTPATIENT
Start: 2021-01-01 | End: 2021-01-01

## 2021-01-01 RX ORDER — INSULIN HUMAN 100 [IU]/ML
8 INJECTION, SOLUTION SUBCUTANEOUS
Qty: 50 | Refills: 0 | Status: DISCONTINUED | OUTPATIENT
Start: 2021-01-01 | End: 2021-01-01

## 2021-01-01 RX ORDER — PROPOFOL 10 MG/ML
10 INJECTION, EMULSION INTRAVENOUS
Qty: 1000 | Refills: 0 | Status: DISCONTINUED | OUTPATIENT
Start: 2021-01-01 | End: 2021-01-01

## 2021-01-01 RX ORDER — NOREPINEPHRINE BITARTRATE/D5W 8 MG/250ML
2.7 PLASTIC BAG, INJECTION (ML) INTRAVENOUS
Qty: 64 | Refills: 0 | Status: DISCONTINUED | OUTPATIENT
Start: 2021-01-01 | End: 2021-01-01

## 2021-01-01 RX ORDER — NOREPINEPHRINE BITARTRATE/D5W 8 MG/250ML
0.05 PLASTIC BAG, INJECTION (ML) INTRAVENOUS
Qty: 16 | Refills: 0 | Status: DISCONTINUED | OUTPATIENT
Start: 2021-01-01 | End: 2021-01-01

## 2021-01-01 RX ORDER — PHENYLEPHRINE HYDROCHLORIDE 10 MG/ML
1 INJECTION INTRAVENOUS
Qty: 160 | Refills: 0 | Status: DISCONTINUED | OUTPATIENT
Start: 2021-01-01 | End: 2021-01-01

## 2021-01-01 RX ORDER — ETOMIDATE 2 MG/ML
20 INJECTION INTRAVENOUS ONCE
Refills: 0 | Status: COMPLETED | OUTPATIENT
Start: 2021-01-01 | End: 2021-01-01

## 2021-01-01 RX ORDER — HYDROCORTISONE 20 MG
100 TABLET ORAL EVERY 8 HOURS
Refills: 0 | Status: DISCONTINUED | OUTPATIENT
Start: 2021-01-01 | End: 2021-01-01

## 2021-01-01 RX ORDER — MORPHINE SULFATE 50 MG/1
4 CAPSULE, EXTENDED RELEASE ORAL ONCE
Refills: 0 | Status: DISCONTINUED | OUTPATIENT
Start: 2021-01-01 | End: 2021-01-01

## 2021-01-01 RX ORDER — DEXTROSE 50 % IN WATER 50 %
50 SYRINGE (ML) INTRAVENOUS
Refills: 0 | Status: DISCONTINUED | OUTPATIENT
Start: 2021-01-01 | End: 2021-01-01

## 2021-01-01 RX ORDER — SODIUM CHLORIDE 9 MG/ML
1000 INJECTION INTRAMUSCULAR; INTRAVENOUS; SUBCUTANEOUS ONCE
Refills: 0 | Status: COMPLETED | OUTPATIENT
Start: 2021-01-01 | End: 2021-01-01

## 2021-01-01 RX ORDER — DOPAMINE HYDROCHLORIDE 40 MG/ML
5 INJECTION, SOLUTION, CONCENTRATE INTRAVENOUS
Qty: 400 | Refills: 0 | Status: DISCONTINUED | OUTPATIENT
Start: 2021-01-01 | End: 2021-01-01

## 2021-01-01 RX ORDER — PANTOPRAZOLE SODIUM 20 MG/1
40 TABLET, DELAYED RELEASE ORAL DAILY
Refills: 0 | Status: DISCONTINUED | OUTPATIENT
Start: 2021-01-01 | End: 2021-01-01

## 2021-01-01 RX ORDER — SODIUM BICARBONATE 1 MEQ/ML
100 SYRINGE (ML) INTRAVENOUS ONCE
Refills: 0 | Status: COMPLETED | OUTPATIENT
Start: 2021-01-01 | End: 2021-01-01

## 2021-01-01 RX ORDER — POTASSIUM CHLORIDE 20 MEQ
20 PACKET (EA) ORAL
Refills: 0 | Status: DISCONTINUED | OUTPATIENT
Start: 2021-01-01 | End: 2021-01-01

## 2021-01-01 RX ORDER — SODIUM BICARBONATE 1 MEQ/ML
150 SYRINGE (ML) INTRAVENOUS ONCE
Refills: 0 | Status: COMPLETED | OUTPATIENT
Start: 2021-01-01 | End: 2021-01-01

## 2021-01-01 RX ORDER — INSULIN LISPRO 100/ML
VIAL (ML) SUBCUTANEOUS EVERY 6 HOURS
Refills: 0 | Status: DISCONTINUED | OUTPATIENT
Start: 2021-01-01 | End: 2021-01-01

## 2021-01-01 RX ORDER — ROCURONIUM BROMIDE 10 MG/ML
70 VIAL (ML) INTRAVENOUS ONCE
Refills: 0 | Status: COMPLETED | OUTPATIENT
Start: 2021-01-01 | End: 2021-01-01

## 2021-01-01 RX ORDER — DEXTROSE 10 % IN WATER 10 %
1000 INTRAVENOUS SOLUTION INTRAVENOUS
Refills: 0 | Status: DISCONTINUED | OUTPATIENT
Start: 2021-01-01 | End: 2021-01-01

## 2021-01-01 RX ORDER — EPINEPHRINE 0.3 MG/.3ML
0.03 INJECTION INTRAMUSCULAR; SUBCUTANEOUS
Qty: 8 | Refills: 0 | Status: DISCONTINUED | OUTPATIENT
Start: 2021-01-01 | End: 2021-01-01

## 2021-01-01 RX ORDER — HYDROMORPHONE HYDROCHLORIDE 2 MG/ML
1 INJECTION INTRAMUSCULAR; INTRAVENOUS; SUBCUTANEOUS ONCE
Refills: 0 | Status: DISCONTINUED | OUTPATIENT
Start: 2021-01-01 | End: 2021-01-01

## 2021-01-01 RX ORDER — MORPHINE SULFATE 50 MG/1
1 CAPSULE, EXTENDED RELEASE ORAL ONCE
Refills: 0 | Status: DISCONTINUED | OUTPATIENT
Start: 2021-01-01 | End: 2021-01-01

## 2021-01-01 RX ORDER — INSULIN HUMAN 100 [IU]/ML
5 INJECTION, SOLUTION SUBCUTANEOUS ONCE
Refills: 0 | Status: COMPLETED | OUTPATIENT
Start: 2021-01-01 | End: 2021-01-01

## 2021-01-01 RX ORDER — PIPERACILLIN AND TAZOBACTAM 4; .5 G/20ML; G/20ML
3.38 INJECTION, POWDER, LYOPHILIZED, FOR SOLUTION INTRAVENOUS EVERY 8 HOURS
Refills: 0 | Status: DISCONTINUED | OUTPATIENT
Start: 2021-01-01 | End: 2021-01-01

## 2021-01-01 RX ORDER — CHLORHEXIDINE GLUCONATE 213 G/1000ML
1 SOLUTION TOPICAL
Refills: 0 | Status: DISCONTINUED | OUTPATIENT
Start: 2021-01-01 | End: 2021-01-01

## 2021-01-01 RX ORDER — INSULIN HUMAN 100 [IU]/ML
5 INJECTION, SOLUTION SUBCUTANEOUS
Qty: 50 | Refills: 0 | Status: DISCONTINUED | OUTPATIENT
Start: 2021-01-01 | End: 2021-01-01

## 2021-01-01 RX ORDER — SODIUM CHLORIDE 9 MG/ML
1000 INJECTION, SOLUTION INTRAVENOUS
Refills: 0 | Status: DISCONTINUED | OUTPATIENT
Start: 2021-01-01 | End: 2021-01-01

## 2021-01-01 RX ORDER — SUCCINYLCHOLINE CHLORIDE 100 MG/5ML
70 SYRINGE (ML) INTRAVENOUS ONCE
Refills: 0 | Status: DISCONTINUED | OUTPATIENT
Start: 2021-01-01 | End: 2021-01-01

## 2021-01-01 RX ORDER — SODIUM CHLORIDE 9 MG/ML
2500 INJECTION INTRAMUSCULAR; INTRAVENOUS; SUBCUTANEOUS ONCE
Refills: 0 | Status: COMPLETED | OUTPATIENT
Start: 2021-01-01 | End: 2021-01-01

## 2021-01-01 RX ORDER — CALCIUM GLUCONATE 100 MG/ML
1 VIAL (ML) INTRAVENOUS ONCE
Refills: 0 | Status: COMPLETED | OUTPATIENT
Start: 2021-01-01 | End: 2021-01-01

## 2021-01-01 RX ORDER — GLUCAGON INJECTION, SOLUTION 0.5 MG/.1ML
1 INJECTION, SOLUTION SUBCUTANEOUS ONCE
Refills: 0 | Status: DISCONTINUED | OUTPATIENT
Start: 2021-01-01 | End: 2021-01-01

## 2021-01-01 RX ORDER — DEXTROSE 50 % IN WATER 50 %
25 SYRINGE (ML) INTRAVENOUS
Refills: 0 | Status: DISCONTINUED | OUTPATIENT
Start: 2021-01-01 | End: 2021-01-01

## 2021-01-01 RX ORDER — SODIUM ZIRCONIUM CYCLOSILICATE 10 G/10G
10 POWDER, FOR SUSPENSION ORAL ONCE
Refills: 0 | Status: COMPLETED | OUTPATIENT
Start: 2021-01-01 | End: 2021-01-01

## 2021-01-01 RX ORDER — NOREPINEPHRINE BITARTRATE/D5W 8 MG/250ML
0.3 PLASTIC BAG, INJECTION (ML) INTRAVENOUS
Qty: 16 | Refills: 0 | Status: DISCONTINUED | OUTPATIENT
Start: 2021-01-01 | End: 2021-01-01

## 2021-01-01 RX ORDER — HEPARIN SODIUM 5000 [USP'U]/ML
5000 INJECTION INTRAVENOUS; SUBCUTANEOUS EVERY 12 HOURS
Refills: 0 | Status: DISCONTINUED | OUTPATIENT
Start: 2021-01-01 | End: 2021-01-01

## 2021-01-01 RX ORDER — TRAMADOL HYDROCHLORIDE 50 MG/1
50 TABLET ORAL EVERY 6 HOURS
Refills: 0 | Status: DISCONTINUED | OUTPATIENT
Start: 2021-01-01 | End: 2021-01-01

## 2021-01-01 RX ORDER — PHENYLEPHRINE HYDROCHLORIDE 10 MG/ML
0.3 INJECTION INTRAVENOUS
Qty: 160 | Refills: 0 | Status: DISCONTINUED | OUTPATIENT
Start: 2021-01-01 | End: 2021-01-01

## 2021-01-01 RX ORDER — INSULIN HUMAN 100 [IU]/ML
10 INJECTION, SOLUTION SUBCUTANEOUS ONCE
Refills: 0 | Status: DISCONTINUED | OUTPATIENT
Start: 2021-01-01 | End: 2021-01-01

## 2021-01-01 RX ORDER — SODIUM CHLORIDE 9 MG/ML
1000 INJECTION INTRAMUSCULAR; INTRAVENOUS; SUBCUTANEOUS ONCE
Refills: 0 | Status: DISCONTINUED | OUTPATIENT
Start: 2021-01-01 | End: 2021-01-01

## 2021-01-01 RX ORDER — IOHEXOL 300 MG/ML
30 INJECTION, SOLUTION INTRAVENOUS ONCE
Refills: 0 | Status: COMPLETED | OUTPATIENT
Start: 2021-01-01 | End: 2021-01-01

## 2021-01-01 RX ADMIN — Medication 250 MILLIGRAM(S): at 20:10

## 2021-01-01 RX ADMIN — SODIUM CHLORIDE 2500 MILLILITER(S): 9 INJECTION INTRAMUSCULAR; INTRAVENOUS; SUBCUTANEOUS at 18:37

## 2021-01-01 RX ADMIN — SODIUM CHLORIDE 2000 MILLILITER(S): 9 INJECTION INTRAMUSCULAR; INTRAVENOUS; SUBCUTANEOUS at 08:43

## 2021-01-01 RX ADMIN — Medication 2: at 12:11

## 2021-01-01 RX ADMIN — MORPHINE SULFATE 1 MILLIGRAM(S): 50 CAPSULE, EXTENDED RELEASE ORAL at 07:06

## 2021-01-01 RX ADMIN — MORPHINE SULFATE 4 MILLIGRAM(S): 50 CAPSULE, EXTENDED RELEASE ORAL at 23:49

## 2021-01-01 RX ADMIN — PHENYLEPHRINE HYDROCHLORIDE 15.9 MICROGRAM(S)/KG/MIN: 10 INJECTION INTRAVENOUS at 04:50

## 2021-01-01 RX ADMIN — IOHEXOL 30 MILLILITER(S): 300 INJECTION, SOLUTION INTRAVENOUS at 18:57

## 2021-01-01 RX ADMIN — Medication 100 MILLIEQUIVALENT(S): at 04:32

## 2021-01-01 RX ADMIN — TRAMADOL HYDROCHLORIDE 50 MILLIGRAM(S): 50 TABLET ORAL at 04:48

## 2021-01-01 RX ADMIN — PROPOFOL 5.08 MICROGRAM(S)/KG/MIN: 10 INJECTION, EMULSION INTRAVENOUS at 11:23

## 2021-01-01 RX ADMIN — DEXTROSE MONOHYDRATE, SODIUM CHLORIDE, AND POTASSIUM CHLORIDE 100 MILLILITER(S): 50; .745; 4.5 INJECTION, SOLUTION INTRAVENOUS at 03:22

## 2021-01-01 RX ADMIN — HYDROMORPHONE HYDROCHLORIDE 1 MILLIGRAM(S): 2 INJECTION INTRAMUSCULAR; INTRAVENOUS; SUBCUTANEOUS at 08:24

## 2021-01-01 RX ADMIN — Medication 50 MILLILITER(S): at 00:14

## 2021-01-01 RX ADMIN — Medication 3.97 MICROGRAM(S)/KG/MIN: at 00:49

## 2021-01-01 RX ADMIN — Medication 100 GRAM(S): at 05:07

## 2021-01-01 RX ADMIN — SODIUM CHLORIDE 1000 MILLILITER(S): 9 INJECTION INTRAMUSCULAR; INTRAVENOUS; SUBCUTANEOUS at 11:25

## 2021-01-01 RX ADMIN — PIPERACILLIN AND TAZOBACTAM 200 GRAM(S): 4; .5 INJECTION, POWDER, LYOPHILIZED, FOR SOLUTION INTRAVENOUS at 20:10

## 2021-01-01 RX ADMIN — PANTOPRAZOLE SODIUM 40 MILLIGRAM(S): 20 TABLET, DELAYED RELEASE ORAL at 16:18

## 2021-01-01 RX ADMIN — ETOMIDATE 20 MILLIGRAM(S): 2 INJECTION INTRAVENOUS at 11:22

## 2021-01-01 RX ADMIN — INSULIN HUMAN 5 UNIT(S): 100 INJECTION, SOLUTION SUBCUTANEOUS at 05:30

## 2021-01-01 RX ADMIN — Medication 100 MILLIGRAM(S): at 21:50

## 2021-01-01 RX ADMIN — Medication 100 MILLIEQUIVALENT(S): at 07:00

## 2021-01-01 RX ADMIN — TRAMADOL HYDROCHLORIDE 50 MILLIGRAM(S): 50 TABLET ORAL at 04:00

## 2021-01-01 RX ADMIN — Medication 50 MILLILITER(S): at 05:07

## 2021-01-01 RX ADMIN — Medication 23.8 MICROGRAM(S)/KG/MIN: at 12:05

## 2021-01-01 RX ADMIN — SODIUM ZIRCONIUM CYCLOSILICATE 10 GRAM(S): 10 POWDER, FOR SUSPENSION ORAL at 05:07

## 2021-01-01 RX ADMIN — Medication 5: at 05:02

## 2021-01-01 RX ADMIN — Medication 70 MILLIGRAM(S): at 16:19

## 2021-01-01 RX ADMIN — Medication 50 MILLILITER(S): at 06:09

## 2021-01-01 RX ADMIN — VASOPRESSIN 2.4 UNIT(S)/MIN: 20 INJECTION INTRAVENOUS at 00:25

## 2021-01-01 RX ADMIN — Medication 125 MEQ/KG/HR: at 00:52

## 2021-01-01 RX ADMIN — PHENYLEPHRINE HYDROCHLORIDE 4.76 MICROGRAM(S)/KG/MIN: 10 INJECTION INTRAVENOUS at 11:25

## 2021-01-01 RX ADMIN — HYDROMORPHONE HYDROCHLORIDE 1 MILLIGRAM(S): 2 INJECTION INTRAMUSCULAR; INTRAVENOUS; SUBCUTANEOUS at 07:05

## 2021-01-01 RX ADMIN — Medication 125 MEQ/KG/HR: at 16:20

## 2021-01-01 RX ADMIN — PIPERACILLIN AND TAZOBACTAM 25 GRAM(S): 4; .5 INJECTION, POWDER, LYOPHILIZED, FOR SOLUTION INTRAVENOUS at 21:57

## 2021-01-01 RX ADMIN — Medication 100 MILLIGRAM(S): at 05:15

## 2021-01-01 RX ADMIN — CHLORHEXIDINE GLUCONATE 1 APPLICATION(S): 213 SOLUTION TOPICAL at 11:26

## 2021-01-01 RX ADMIN — MORPHINE SULFATE 1 MILLIGRAM(S): 50 CAPSULE, EXTENDED RELEASE ORAL at 06:26

## 2021-01-01 RX ADMIN — MORPHINE SULFATE 4 MILLIGRAM(S): 50 CAPSULE, EXTENDED RELEASE ORAL at 18:57

## 2021-01-01 RX ADMIN — SODIUM CHLORIDE 1000 MILLILITER(S): 9 INJECTION INTRAMUSCULAR; INTRAVENOUS; SUBCUTANEOUS at 05:02

## 2021-01-01 RX ADMIN — Medication 100 MILLIEQUIVALENT(S): at 22:30

## 2021-01-01 RX ADMIN — HEPARIN SODIUM 5000 UNIT(S): 5000 INJECTION INTRAVENOUS; SUBCUTANEOUS at 05:02

## 2021-01-01 RX ADMIN — PIPERACILLIN AND TAZOBACTAM 25 GRAM(S): 4; .5 INJECTION, POWDER, LYOPHILIZED, FOR SOLUTION INTRAVENOUS at 05:15

## 2021-01-01 RX ADMIN — Medication 3.97 MICROGRAM(S)/KG/MIN: at 21:48

## 2021-01-01 RX ADMIN — VASOPRESSIN 2.4 UNIT(S)/MIN: 20 INJECTION INTRAVENOUS at 15:30

## 2021-01-01 RX ADMIN — Medication 150 MILLIEQUIVALENT(S): at 16:38

## 2021-01-01 RX ADMIN — Medication 150 MILLIEQUIVALENT(S): at 10:38

## 2021-01-01 RX ADMIN — PIPERACILLIN AND TAZOBACTAM 200 GRAM(S): 4; .5 INJECTION, POWDER, LYOPHILIZED, FOR SOLUTION INTRAVENOUS at 03:23

## 2021-01-01 RX ADMIN — PIPERACILLIN AND TAZOBACTAM 25 GRAM(S): 4; .5 INJECTION, POWDER, LYOPHILIZED, FOR SOLUTION INTRAVENOUS at 16:19

## 2021-01-01 RX ADMIN — SODIUM CHLORIDE 1000 MILLILITER(S): 9 INJECTION INTRAMUSCULAR; INTRAVENOUS; SUBCUTANEOUS at 16:20

## 2021-01-01 RX ADMIN — Medication 100 MILLIGRAM(S): at 16:38

## 2021-01-01 RX ADMIN — Medication 23.8 MICROGRAM(S)/KG/MIN: at 18:19

## 2021-07-19 NOTE — ED PROVIDER NOTE - PROGRESS NOTE DETAILS
Labs significant for Na 126 likely 2/2 dehydration --> given IVF, went up to 129.   Glucose in the 300s on two repeats.  Elevated bilis, alk phos, AST (pt without abdominal pain, no EtOH hx)  CXR appears normal  D-dimer 1205 -->   CTA chest performed: The liver is enlarged and has a nodular contour. Question cirrhosis. There is a 6.5 x 6.1 sagittal area of hypodensity in the right lobe which may represent a mass. Evaluation is limited on this early phase arterial exam. Recommend dedicated hepatic imaging, preferably contrast-enhanced MRI, for further evaluation. Enlarged periportal lymph node measuring 2.6 x 1.9 cm (5:162).    Results explained to patient. Hyponatremia likely 2/2 dehydration, patient able to PO hydrate himself. Offered admission for hydration but patient declined.  Advised pt to follow up with PMD regarding these abnormalities, advised outpatient MRI and stressed importance of cancer screening. Pt verbalized understanding. Discharged.

## 2021-07-19 NOTE — ED PROVIDER NOTE - PHYSICAL EXAMINATION
Afebrile, hemodynamically stable, saturating well  NAD, well appearing  Head NCAT  EOMI grossly, anicteric  MMM  No JVD  tachycardic, regular, nml S1/S2, no m/r/g  Lungs CTAB, no w/r/r  Abd soft, NT, ND, nml BS, no rebound or guarding  AAO, CN's 3-12 grossly intact  JAMES spontaneously, no leg cyanosis or edema  Skin warm, well perfused, no rashes or hives

## 2021-07-19 NOTE — ED PROVIDER NOTE - CLINICAL SUMMARY MEDICAL DECISION MAKING FREE TEXT BOX
65-year-old male no PMHx presenting with flu like sx for 1 week. Tachycardic and fatigued on exam. Will do full infectious/metabolic workup including labs, CXR, urine studies, ECG, and hydrate. Dispo pending results and clinical course.

## 2021-07-19 NOTE — ED PROVIDER NOTE - PATIENT PORTAL LINK FT
You can access the FollowMyHealth Patient Portal offered by [facility] by registering at the following website: [email or web address]. By joining [facility]’s FollowMyHealth portal, you will also be able to view your health information using other applications (apps) compatible with our system.

## 2021-07-19 NOTE — ED ADULT NURSE NOTE - OBJECTIVE STATEMENT
Patient presents with c/o  body ache fatigue cough runny nose for 1week stated had  COVID 19 vaccine shots in April, no chest/back pain SOB noted.

## 2021-07-19 NOTE — ED ADULT NURSE NOTE - NSIMPLEMENTINTERV_GEN_ALL_ED
Implemented All Universal Safety Interventions:  [place] to call system. Call bell, personal items and telephone within reach. Instruct patient to call for assistance. Room bathroom lighting operational. Non-slip footwear when patient is off stretcher. Physically safe environment: no spills, clutter or unnecessary equipment. Stretcher in lowest position, wheels locked, appropriate side rails in place.

## 2021-07-19 NOTE — ED PROVIDER NOTE - NSFOLLOWUPINSTRUCTIONS_ED_ALL_ED_FT
You were seen in the emergency department for: vomiting, diarrhea, weakness  Your diagnosis for this visit was: dehydration, low sodium, high glucose, abnormal CT results  From this ED visit you were prescribed: N/A    Your labs demonstrated that you are dehydrated - make sure you keep hydrated. You have high glucose as well - you need a diabetes evaluation from your doctor. You were found to have abnormal liver tests, and your CT showed possible cirrhosis and a lesion that may be a mass. It is very important that you follow up with your PCP to have an MRI of your abdomen and cancer screening.     You may be contacted by the Emergency Department Referrals Coordinator to set up your follow-up appointment within 24-48 hours of your discharge, Monday to Friday. We recommend you follow up with: primary cer    Please return to the Emergency Department if you experience any of the following symptoms:   - Shortness of breath or trouble breathing  - Pressure, pain or tightness in the chest  - Face drooping, arm weakness or speech difficulty  - Persistence of severe vomiting  - Head injury or loss of consciousness  - Nonstop bleeding or an open wound    (1) Follow up with your primary care physician within the next 24-48 hours as discussed. In addition, we did not find evidence of a life threatening illness on your testing here today, but listed below are the specialists that will be necessary to see as an outpatient to continue the workup.  Please call the numbers listed below or [phone] to set up the necessary appointments.  (2) Take Tylenol (up to 1000mg or 1 g)  and/or Motrin (up to 600mg) up to every 6 hours as needed for pain.   (3) If you had an IV (intravenous) line placed, it was removed. Sometimes, after IV removal, that area can be tender for a few days; if it develops redness and swelling, those could be signs of infection; in which case, return to the Emergency Department for assessment.  (4) Please continue taking all of your home medications as directed.

## 2021-07-19 NOTE — ED PROVIDER NOTE - OBJECTIVE STATEMENT
65-year-old male no PMHx presenting with flu like symptoms x 1week. His symptoms started 8 days ago when he vomited 18 times on the LIRR, NBNB emesis. Developed nonbloody diarrhea, multiple episodes. Has been excessively fatigued. +cough, runny nose. +sweats/subjective fevers, chills. Denies chest pain, abdominal pain, black or bloody stools, or urinary symptoms. Has not seen a PMD in >10 years. Received COVID shots in April - notes that he was sick with similar sx for 5-6 weeks after getting his second shot but his symptoms had resolved afterwards.

## 2021-07-22 NOTE — H&P ADULT - NSHPLABSRESULTS_GEN_ALL_CORE
LABS:                        14.2   15.99 )-----------( 184      ( 2021 17:58 )             43.2         128<L>  |  90<L>  |  40<H>  ----------------------------<  415<H>  4.2   |  19<L>  |  2.05<H>    Ca    8.3<L>      2021 17:58  Mg     2.2         TPro  6.5  /  Alb  1.6<L>  /  TBili  3.0<H>  /  DBili  x   /  AST  285<H>  /  ALT  143<H>  /  AlkPhos  253<H>      PT/INR - ( 2021 17:58 )   PT: 16.7 sec;   INR: 1.43 ratio         PTT - ( 2021 17:58 )  PTT:30.2 sec  Urinalysis Basic - ( 2021 21:41 )    Color: Agata / Appearance: Clear / S.025 / pH: x  Gluc: x / Ketone: Trace  / Bili: Moderate / Urobili: 4   Blood: x / Protein: 30 mg/dL / Nitrite: Positive   Leuk Esterase: Trace / RBC: 2-5 /HPF / WBC 6-10 /HPF   Sq Epi: x / Non Sq Epi: Few /HPF / Bacteria: TNTC /HPF      LIVER FUNCTIONS - ( 2021 17:58 )  Alb: 1.6 g/dL / Pro: 6.5 g/dL / ALK PHOS: 253 U/L / ALT: 143 U/L DA / AST: 285 U/L / GGT: x           Lactate, Blood: 11.5 mmol/L (21 @ 22:46)  Lactate, Blood: 10.9 mmol/L (21 @ 19:13)

## 2021-07-22 NOTE — ED PROVIDER NOTE - CLINICAL SUMMARY MEDICAL DECISION MAKING FREE TEXT BOX
64 y/o M presents with abdominal pain, diarrhea and feeling unwell. Will repeat labs, CT abdomen and likely admit.

## 2021-07-22 NOTE — H&P ADULT - NSHPREVIEWOFSYSTEMS_GEN_ALL_CORE
REVIEW OF SYSTEMS:    CONSTITUTIONAL: weakness, fevers or chills are present  EYES/ENT: No visual changes;  No vertigo or throat pain   NECK: No pain or stiffness  RESPIRATORY: Cough, shortness of breath is present  CARDIOVASCULAR: No chest pain or palpitations  GASTROINTESTINAL: Abdominal pain is present  GENITOURINARY: No dysuria, frequency or hematuria  NEUROLOGICAL: No numbness or weakness  SKIN: No itching, burning, rashes, or lesions

## 2021-07-22 NOTE — H&P ADULT - ASSESSMENT
Patient is a 64 y/o male from home, ambulates independently, lives with family with no significant PMH who presents to the ED with a chief complaint of abdominal pain and diarrhea x 10 days. Admitted to ICU for Sepsis due to sep    Sepsis  Lactic acidosis  Septic emboli  Liver Abscess  Hyponatremia  High anion gap metabolic acidosis  NANCY  Hyperglycemia  Transaminitis    CNS  #CVA   Acute Encephalopathy AAO x 1-2 and baseline mental status 3  CT scan revealed   Neurochecks    Started on precedex/propofol/fentanyl  =====================[CVS ]===============================  # CHF  continue with Levophed   Holding anti hypertensives for now.    #Troponinemia  - Likely secondary to  EKG  Troponins are elevated   Echo   Cardio, on board    =====================[RESP ]==============================  # Acute Hypoxic Resp Failure 2/2 ?Aspiration pneumonia   CXR   CT chest revealed no evidence of infiltrates  ABG showed   - Mechanical ventilation with lung protective strategy   - Daily awakening trials and Daily weaning trials   Chest PT and pulmonary toilet  s/p one dose of zosyn and vancomycin  -f/u sputum cultures, blood cultures  Aspiration precautions      =====================[ GI ]================================  # Transaminitis LFTs are elevated, likely secondary to Alcohol abuse vs Rhabdomyolysis  - Continue to trend CMP  - transaminitis and bili are all trending up  f/u acute hepatiits panel  f/u Ultrasound of liver  - .      ====================[ RENAL ]=============================   # NANCY due to  Rhabdomyolysis vs poor oral intake vs chf  s/p Nixon   f/u Urine lytes, U/A   f/u Ultrasound of kidney  Avoid nephrotoxins  monitor urine output-    =====================[ ID ]================================  Septic shock due to ?UTI and ?Pneumonia  Febrile, Hypotension, Tachycardia, leucocytosis, lactate of 2.7  U/A mildly positive, CT chest  f/u Blood and Urine cultures-  started on Zosyn and vancomycin      ===================[ HEME/ONC ]===========================  # Anemia  - Thrombocytopenia  =====================[ ENDO ]=============================  # history of DM  - target CBG < 180  started on ISS      MUSCULOSKELETAL   no issues    SKIN  # no issues    ==================[ PROPHYLAXIS ]==========================   # Dvt : Lovenox sc   # Gi : Protonix     ====================[ DISPO ]==============================   - Monitor in ICU   GOC Full code   Patient is a 66 y/o male from home, ambulates independently, lives with family with no significant PMH who presents to the ED with a chief complaint of abdominal pain and diarrhea x 10 days. Admitted to ICU for Sepsis due to sep    Sepsis  Lactic acidosis  Septic emboli  Liver Abscess  Hyponatremia  High anion gap metabolic acidosis  NANCY  Hyperglycemia  Transaminitis    CNS  # AAO x 3   no issues  =====================[CVS ]===============================  # EKG showed Sinus tachycardia.    =====================[RESP ]==============================  # no issues      =====================[ GI ]================================  # Transaminitis LFTs are elevated, likely secondary to Liver abscess  - Continue to trend CMP  f/u acute hepatiits panel  CT Abdomen showed Numerous new nodules and nodular masslike consolidations in the lung bases, several of which demonstrate central cavitation. There is concern for septic emboli. Small bilateral pleural effusions.  Nodularity of the surface contour of the liver concerning first row cyst.     ====================[ RENAL ]=============================   # NANCY due to Dehydration vs poor oral intake   BUN/ Cr is 40/2.05 and unknown baseline  f/u Urine lytes, U/A   f/u Ultrasound of kidney  Avoid nephrotoxins  monitor urine output-    Hyponatremia   Sodium of 128  Corrected sodium is 133 for hyperglycemia    =====================[ ID ]================================  Sepsis with Leucocytosis of 16k, lactate of 10.9  s/p 2.5L  s/p zosyn and vancomycin in ED   U/A mildly positive,   f/u Blood and Urine cultures-  started on Zosyn    ===================[ HEME/ONC ]===========================  # no issues  =====================[ ENDO ]=============================  # Uncontrolled hyperglycemia  -Anion Gap of 19 due to Lactic acidosis, Blood glucose of 415  s/p regular insulin  Monitor Blood glucose every 6hrs as the pt is npo    MUSCULOSKELETAL   no issues    SKIN  # no issues    ==================[ PROPHYLAXIS ]==========================   # Dvt : Lovenox sc   # Gi : Protonix     ====================[ DISPO ]==============================   - Monitor in ICU   GOC Full code   Patient is a 64 y/o male from home, ambulates independently, lives with family with no significant PMH who presents to the ED with a chief complaint of abdominal pain and diarrhea x 10 days. Admitted to ICU for Sepsis due to sep    Sepsis  Lactic acidosis  Septic emboli  Liver Abscess  Hyponatremia  High anion gap metabolic acidosis  NANCY  Hyperglycemia  Transaminitis    CNS  # AAO x 3   no issues  =====================[CVS ]===============================  # EKG showed Sinus tachycardia.    =====================[RESP ]==============================  # no issues      =====================[ GI ]================================  # Transaminitis LFTs are elevated, likely secondary to Liver abscess  - Continue to trend CMP  f/u acute hepatiits panel  CT Abdomen showed Numerous new nodules and nodular masslike consolidations in the lung bases, several of which demonstrate central cavitation. There is concern for septic emboli. Small bilateral pleural effusions.  Nodularity of the surface contour of the liver concerning first row cyst.   GI Dr Ambriz consulted    ====================[ RENAL ]=============================   # NANCY due to Dehydration vs poor oral intake   BUN/ Cr is 40/2.05 and unknown baseline  f/u Urine lytes, U/A   f/u Ultrasound of kidney  Avoid nephrotoxins  monitor urine output-    Hyponatremia   Sodium of 128  Corrected sodium is 133 for hyperglycemia    =====================[ ID ]================================  Sepsis with Leucocytosis of 16k, lactate of 10.9  s/p 2.5L  s/p zosyn and vancomycin in ED   U/A mildly positive,   f/u Blood and Urine cultures-  started on Zosyn  ID Dr Cardoso consulted    ===================[ HEME/ONC ]===========================  # no issues  =====================[ ENDO ]=============================  # Uncontrolled hyperglycemia  -Anion Gap of 19 due to Lactic acidosis, Blood glucose of 415  s/p regular insulin  Monitor Blood glucose every 6hrs as the pt is npo  ISS for now  f/u Hba1c     MUSCULOSKELETAL   no issues    SKIN  # no issues    ==================[ PROPHYLAXIS ]==========================   # Dvt : Heparin sc   # Gi : Protonix     ====================[ DISPO ]==============================   - Monitor in ICU   GOC Full code

## 2021-07-22 NOTE — H&P ADULT - HISTORY OF PRESENT ILLNESS
Patient is a 66 y/o male from home, ambulates independently, lives with family with no significant PMH who presents to the ED with a chief complaint of abdominal pain and diarrhea x 10 days. It is associated with nausea, dizziness, low grade fever, profusely sweating, loss of appetite, weight loss, dry cough, shortness of breath, not eating, weight loss. He received Moderna vaccine in April. Patient denies recent travel, sick contacts, exposure to TB, not sexually active, not on any recent antibiotics. He visited ED 2 days back for abdominal pain. He doesn't see his PMD for 10 yrs.  [facility] Full code Patient is a 64 y/o male from home, ambulates independently, lives with family with no significant PMH who presents to the ED with a chief complaint of abdominal pain and diarrhea x 10 days. It is associated with nausea, dizziness, low grade fever, profusely sweating, loss of appetite, weight loss, dry cough, shortness of breath, not eating, weight loss. He received Moderna vaccine in April. He had flu like symptoms few days back. Patient denies recent travel, sick contacts, exposure to TB, not sexually active, not on any recent antibiotics. He visited ED 2 days back for abdominal pain. He doesn't see his PMD for 10 yrs.  [facility] Full code

## 2021-07-22 NOTE — ED PROVIDER NOTE - CARE PLAN
Principal Discharge DX:	Septic embolism  Secondary Diagnosis:	Liver mass  Secondary Diagnosis:	Lactic acidosis  Secondary Diagnosis:	Hyperglycemia  Secondary Diagnosis:	Renal failure

## 2021-07-22 NOTE — ED PROVIDER NOTE - PROGRESS NOTE DETAILS
labs reveal lactic acidosis, renal failure, leukocytosis. CT reveals new septic emboli and possible infection of liver mass. admit to ICU. lactic acid higher after fluids and abx

## 2021-07-22 NOTE — H&P ADULT - NSHPPHYSICALEXAM_GEN_ALL_CORE
PHYSICAL EXAM:  GENERAL: NAD, obese  HEAD:  Atraumatic, Normocephalic  EYES:  conjunctiva and sclera clear  NECK: Supple, No JVD, Normal thyroid  CHEST/LUNG: Clear to auscultation. Clear to percussion bilaterally; No rales, rhonchi, wheezing, or rubs  HEART: Regular rate and rhythm; No murmurs, rubs, or gallops  ABDOMEN: Abdominal tenderness is present, no rigidity, no guarding  NERVOUS SYSTEM:  Alert & Oriented X3.  EXTREMITIES:  2+ Peripheral Pulses, No clubbing, cyanosis, or edema  SKIN: warm dry

## 2021-07-22 NOTE — H&P ADULT - ATTENDING COMMENTS
Patient is a 64 y/o male with no significant PMH who presents to the ED with a chief complaint of abdominal pain and diarrhea x 10 days. He had a CT of the abdomen which shows multiple nodular lesions of the lung base with cavitation ( concerning for septic emboli ) and a low attenuation lesion of the liver, possible abscess 9x6cm. His serum lactate is 11mmol/l and there is azotemia vs ATN, bun/creatinine - 40/2.0/ The patient is normotensive  but given his obvious source of sepsis and end organ failure the patient will need a high level of therapeutic intervention and I have accepted him for admission to ICU. Dx- liver abscess, sepsis, lactic acidosis. Patient has received 2.5 L IVF in ED so will continue with resuscitation and repeat lactate. Continue zosyn, send stool for Ova and Parasites, GI consult. I reviewed all laboratory and roentgenographic data and any previous medical record from [facility] that existed. I also discussed the case with the ED attending or referring physician.

## 2021-07-22 NOTE — ED ADULT NURSE NOTE - OBJECTIVE STATEMENT
Pt c/o abdominal pain, and diarhea, and 10/10 pain with deep breathing. Pt states he fell a couple of days ago and has been having difficulty [id number] since. States he is not SOB, but he is trying not to breath deep due to pain. EKG done, pt placed on cardiac monitor.

## 2021-07-22 NOTE — ED PROVIDER NOTE - NS_ATTENDINGSCRIBE_ED_ALL_ED
Other (Specify) I personally performed the service described in the documentation recorded by the scribe in my presence, and it accurately and completely records my words and actions.

## 2021-07-23 NOTE — CONSULT NOTE ADULT - ATTENDING COMMENTS
Patient examined and imaging reviewed. He is s/p percutaneous drainage of liver abscess.     There is no role for acute surgical intervention at this time. The patient will not tolerate anesthesia and drainage of the abscess and peritoneal cavity can be achieved as effectively with IR. Would continue supportive care and serial imaging.
I agree with above
I reviewed the above and edited where appropriate.   Chart and relevant imaging reviewed.   -patient for liver abscess drainage today.    The procedure, its risks/benefits/alternatives were discussed with the patient's brother, Marquez Campbell ([phone]), who verbalizes understanding, and informed consent was obtained.

## 2021-07-23 NOTE — PROCEDURE NOTE - PROCEDURE FINDINGS AND DETAILS
Liver abscess again identified. 12 Fr pigtail drainage catheter placed. 1cc bloody purulent-appearing fluid aspirated and sent for culture. Catheter attached to gravity drainage.

## 2021-07-23 NOTE — CHART NOTE - NSCHARTNOTEFT_GEN_A_CORE
Spoke to ID Dr Glory Cardoso and discussed blood cultures result , growing klebsiella. Continue with Zosyn and will wait for sensitivities to deescalate antibiotics.   Spoke to Surgery PA Antonella Villavicencio for any intervention from Surgery regarding liver abscess. Likely bowel ischemia as per Radiologist. No surgical intervention for now as pt is unstable.

## 2021-07-23 NOTE — PROGRESS NOTE ADULT - SUBJECTIVE AND OBJECTIVE BOX
INTERVAL HPI/OVERNIGHT EVENTS: ***    PRESSORS: [ ] YES [ ] NO  WHICH:    ANTIBIOTICS:                      Antimicrobial:  piperacillin/tazobactam IVPB.. 3.375 Gram(s) IV Intermittent every 8 hours    Cardiovascular:  norepinephrine Infusion 0.05 MICROgram(s)/kG/Min IV Continuous <Continuous>  phenylephrine    Infusion 0.3 MICROgram(s)/kG/Min IV Continuous <Continuous>    Pulmonary:    Hematalogic:  heparin   Injectable 5000 Unit(s) SubCutaneous every 12 hours    Other:  chlorhexidine 2% Cloths 1 Application(s) Topical daily  dextrose 5%. 1000 milliLiter(s) IV Continuous <Continuous>  glucagon  Injectable 1 milliGRAM(s) IntraMuscular once  insulin lispro (ADMELOG) corrective regimen sliding scale   SubCutaneous every 6 hours  insulin regular Infusion 5 Unit(s)/Hr IV Continuous <Continuous>  pantoprazole  Injectable 40 milliGRAM(s) IV Push daily  propofol Infusion 10 MICROgram(s)/kG/Min IV Continuous <Continuous>  succinylcholine Injectable 70 milliGRAM(s) IV Push once  traMADol 50 milliGRAM(s) Oral every 6 hours PRN    chlorhexidine 2% Cloths 1 Application(s) Topical daily  dextrose 5%. 1000 milliLiter(s) IV Continuous <Continuous>  glucagon  Injectable 1 milliGRAM(s) IntraMuscular once  heparin   Injectable 5000 Unit(s) SubCutaneous every 12 hours  insulin lispro (ADMELOG) corrective regimen sliding scale   SubCutaneous every 6 hours  insulin regular Infusion 5 Unit(s)/Hr IV Continuous <Continuous>  norepinephrine Infusion 0.05 MICROgram(s)/kG/Min IV Continuous <Continuous>  pantoprazole  Injectable 40 milliGRAM(s) IV Push daily  phenylephrine    Infusion 0.3 MICROgram(s)/kG/Min IV Continuous <Continuous>  piperacillin/tazobactam IVPB.. 3.375 Gram(s) IV Intermittent every 8 hours  propofol Infusion 10 MICROgram(s)/kG/Min IV Continuous <Continuous>  succinylcholine Injectable 70 milliGRAM(s) IV Push once  traMADol 50 milliGRAM(s) Oral every 6 hours PRN    Drug Dosing Weight  Height (cm): 185.4 (2021 17:35)  Weight (kg): 84.6 (2021 03:00)  BMI (kg/m2): 24.6 (2021 03:00)  BSA (m2): 2.09 (2021 03:00)    CENTRAL LINE: [ ] YES [ ] NO  LOCATION:   DATE INSERTED:  REMOVE: [ ] YES [ ] NO  EXPLAIN:    SCHWARTZ: [ ] YES [ ] NO    DATE INSERTED:  REMOVE:  [ ] YES [ ] NO  EXPLAIN:    A-LINE:  [ ] YES [ ] NO  LOCATION:   DATE INSERTED:  REMOVE:  [ ] YES [ ] NO  EXPLAIN:    PMH -reviewed admission note, no change since admission    ICU Vital Signs Last 24 Hrs  T(C): 34.5 (2021 08:00), Max: 37.1 (2021 22:45)  T(F): 94.1 (2021 08:00), Max: 98.7 (2021 22:45)  HR: 119 (2021 11:06) (96 - 120)  BP: 126/74 (2021 11:00) (65/38 - 160/84)  BP(mean): 85 (2021 11:00) (42 - 101)  ABP: --  ABP(mean): --  RR: 14 (2021 11:00) (14 - 42)  SpO2: 99% (2021 11:06) (89% - 99%)      ABG - ( 2021 09:59 )  pH, Arterial: 7.15  pH, Blood: x     /  pCO2: 29    /  pO2: 72    / HCO3: 10    / Base Excess: -17.4 /  SaO2: 93                     @ 07:01  -  07-23 @ 07:00  --------------------------------------------------------  IN: 1500 mL / OUT: 0 mL / NET: 1500 mL        Mode: AC/ CMV (Assist Control/ Continuous Mandatory Ventilation)  RR (machine): 26  TV (machine): 0.45  FiO2: 100  PEEP: 7  ITime: 1  MAP: 13  PIP: 24      PHYSICAL EXAM:    GENERAL: NAD, well-groomed, well-developed  HEAD:  Atraumatic, Normocephalic  EYES: EOMI, PERRLA, conjunctiva and sclera clear  ENMT: No tonsillar erythema, exudates, or enlargement; Moist mucous membranes, Good dentition, No lesions  NECK: Supple, normal appearance, No JVD; Normal thyroid; Trachea midline  NERVOUS SYSTEM:  Alert & Oriented X3, Good concentration; Motor Strength 5/5 B/L upper and lower extremities; DTRs 2+ intact and symmetric  CHEST/LUNG: No chest deformity; Normal percussion bilaterally; No rales, rhonchi, wheezing   HEART: Regular rate and rhythm; No murmurs, rubs, or gallops  ABDOMEN: Soft, Nontender, Nondistended; Bowel sounds present  EXTREMITIES:  2+ Peripheral Pulses, No clubbing, cyanosis, or edema  LYMPH: No lymphadenopathy noted  SKIN: No rashes or lesions; Good capillary refill      LABS:  CBC Full  -  ( 2021 10:59 )  WBC Count : 2.66 K/uL  RBC Count : 4.62 M/uL  Hemoglobin : 12.9 g/dL  Hematocrit : 40.4 %  Platelet Count - Automated : 160 K/uL  Mean Cell Volume : 87.4 fl  Mean Cell Hemoglobin : 27.9 pg  Mean Cell Hemoglobin Concentration : 31.9 gm/dL  Auto Neutrophil # : x  Auto Lymphocyte # : x  Auto Monocyte # : x  Auto Eosinophil # : x  Auto Basophil # : x  Auto Neutrophil % : x  Auto Lymphocyte % : x  Auto Monocyte % : x  Auto Eosinophil % : x  Auto Basophil % : x        128<L>  |  92<L>  |  47<H>  ----------------------------<  440<H>  3.7   |  17<L>  |  2.17<H>    Ca    7.7<L>      2021 04:16  Phos  4.9       Mg     2.1         TPro  5.6<L>  /  Alb  1.4<L>  /  TBili  2.5<H>  /  DBili  x   /  AST  267<H>  /  ALT  133<H>  /  AlkPhos  185<H>      PT/INR - ( 2021 10:59 )   PT: 18.1 sec;   INR: 1.55 ratio         PTT - ( 2021 10:59 )  PTT:30.5 sec  Urinalysis Basic - ( 2021 21:41 )    Color: Agata / Appearance: Clear / S.025 / pH: x  Gluc: x / Ketone: Trace  / Bili: Moderate / Urobili: 4   Blood: x / Protein: 30 mg/dL / Nitrite: Positive   Leuk Esterase: Trace / RBC: 2-5 /HPF / WBC 6-10 /HPF   Sq Epi: x / Non Sq Epi: Few /HPF / Bacteria: TNTC /HPF      Culture Results:   Growth in aerobic and anaerobic bottles: Gram Variable Rods  ***Blood Panel PCR results on this specimen are available  approximately 3 hours after the Gram stain result.***  Gram stain, PCR, and/or culture results may not always  correspond due todifference in methodologies.  ************************************************************  This PCR assay was performed by multiplex PCR. This  Assay tests for 66 bacterial and resistance gene targets.  Please refer to the [facility] testdirectory  at [email or web address] for details. ( @ 22:16)  Culture Results:   Growth in aerobic and anaerobic bottles: Gram Variable Rods ( @ 22:00)      RADIOLOGY & ADDITIONAL STUDIES REVIEWED:  ***    GOALS OF CARE DISCUSSION WITH PATIENT/FAMILY/PROXY:    CRITICAL CARE TIME SPENT: 35 minutes INTERVAL HPI/OVERNIGHT EVENTS: Pt went into acute hypoxic hypercapnic respiratory failure intubated and sedated.   Septic shock secondary to liver abscess requiring pressors   Blood gas showed high CO2 and low bicarb  Plan for IR drainage liver abscess   Renal Functions getting worse   BCx +ve for Klebsiella   Consulted ID, GI, IR, and Nephro    PRESSORS: [x ] YES [ ] NO  WHICH: Levophed     ANTIBIOTICS:                      Antimicrobial:  piperacillin/tazobactam IVPB.. 3.375 Gram(s) IV Intermittent every 8 hours    Cardiovascular:  norepinephrine Infusion 0.05 MICROgram(s)/kG/Min IV Continuous <Continuous>  phenylephrine    Infusion 0.3 MICROgram(s)/kG/Min IV Continuous <Continuous>    Pulmonary:    Hematalogic:  heparin   Injectable 5000 Unit(s) SubCutaneous every 12 hours    Other:  chlorhexidine 2% Cloths 1 Application(s) Topical daily  dextrose 5%. 1000 milliLiter(s) IV Continuous <Continuous>  glucagon  Injectable 1 milliGRAM(s) IntraMuscular once  insulin lispro (ADMELOG) corrective regimen sliding scale   SubCutaneous every 6 hours  insulin regular Infusion 5 Unit(s)/Hr IV Continuous <Continuous>  pantoprazole  Injectable 40 milliGRAM(s) IV Push daily  propofol Infusion 10 MICROgram(s)/kG/Min IV Continuous <Continuous>  succinylcholine Injectable 70 milliGRAM(s) IV Push once  traMADol 50 milliGRAM(s) Oral every 6 hours PRN    chlorhexidine 2% Cloths 1 Application(s) Topical daily  dextrose 5%. 1000 milliLiter(s) IV Continuous <Continuous>  glucagon  Injectable 1 milliGRAM(s) IntraMuscular once  heparin   Injectable 5000 Unit(s) SubCutaneous every 12 hours  insulin lispro (ADMELOG) corrective regimen sliding scale   SubCutaneous every 6 hours  insulin regular Infusion 5 Unit(s)/Hr IV Continuous <Continuous>  norepinephrine Infusion 0.05 MICROgram(s)/kG/Min IV Continuous <Continuous>  pantoprazole  Injectable 40 milliGRAM(s) IV Push daily  phenylephrine    Infusion 0.3 MICROgram(s)/kG/Min IV Continuous <Continuous>  piperacillin/tazobactam IVPB.. 3.375 Gram(s) IV Intermittent every 8 hours  propofol Infusion 10 MICROgram(s)/kG/Min IV Continuous <Continuous>  succinylcholine Injectable 70 milliGRAM(s) IV Push once  traMADol 50 milliGRAM(s) Oral every 6 hours PRN    Drug Dosing Weight  Height (cm): 185.4 (2021 17:35)  Weight (kg): 84.6 (2021 03:00)  BMI (kg/m2): 24.6 (2021 03:00)  BSA (m2): 2.09 (2021 03:00)    CENTRAL LINE: [ x] YES [ ] NO  LOCATION:   DATE INSERTED: R Femoral   REMOVE: [ ] YES [ ] NO  EXPLAIN:     SCHWARTZ: [ x] YES [ ] NO    DATE INSERTED:  REMOVE:  [ ] YES [ ] NO  EXPLAIN:    A-LINE:  [ x] YES [ ] NO  LOCATION:   DATE INSERTED: R Axial   REMOVE:  [ ] YES [ ] NO  EXPLAIN:    PMH -reviewed admission note, no change since admission    ICU Vital Signs Last 24 Hrs  T(C): 34.5 (2021 08:00), Max: 37.1 (2021 22:45)  T(F): 94.1 (2021 08:00), Max: 98.7 (2021 22:45)  HR: 119 (2021 11:06) (96 - 120)  BP: 126/74 (2021 11:00) (65/38 - 160/84)  BP(mean): 85 (2021 11:00) (42 - 101)  ABP: --  ABP(mean): --  RR: 14 (2021 11:00) (14 - 42)  SpO2: 99% (2021 11:06) (89% - 99%)      ABG - ( 2021 09:59 )  pH, Arterial: 7.15  pH, Blood: x     /  pCO2: 29    /  pO2: 72    / HCO3: 10    / Base Excess: -17.4 /  SaO2: 93                     @ 07:01  -  -23 @ 07:00  --------------------------------------------------------  IN: 1500 mL / OUT: 0 mL / NET: 1500 mL        Mode: AC/ CMV (Assist Control/ Continuous Mandatory Ventilation)  RR (machine): 26  TV (machine): 0.45  FiO2: 100  PEEP: 7  ITime: 1  MAP: 13  PIP: 24      PHYSICAL EXAM:    GENERAL: AxO 3 but later intubated due to encephalopathy   HEAD:  Atraumatic, Normocephalic  EYES: EOMI, PERRLA, conjunctiva and sclera clear  ENMT: No tonsillar erythema, exudates, or enlargement; Moist mucous membranes, Good dentition, No lesions, ett+ve   NECK: Supple, normal appearance, No JVD; Normal thyroid; Trachea midline, R Femoral  NERVOUS SYSTEM:  Alert & Oriented X3, intubated later on   CHEST/LUNG: mild crackles on R lung lower lobe   HEART: Regular rate and rhythm; No murmurs, rubs, or gallops  ABDOMEN: Soft, tender +ve, distended; Bowel sounds present  EXTREMITIES:  2+ Peripheral Pulses, No clubbing, cyanosis, or edema  LYMPH: No lymphadenopathy noted  SKIN: No rashes or lesions; Good capillary refill      LABS:  CBC Full  -  ( 2021 10:59 )  WBC Count : 2.66 K/uL  RBC Count : 4.62 M/uL  Hemoglobin : 12.9 g/dL  Hematocrit : 40.4 %  Platelet Count - Automated : 160 K/uL  Mean Cell Volume : 87.4 fl  Mean Cell Hemoglobin : 27.9 pg  Mean Cell Hemoglobin Concentration : 31.9 gm/dL  Auto Neutrophil # : x  Auto Lymphocyte # : x  Auto Monocyte # : x  Auto Eosinophil # : x  Auto Basophil # : x  Auto Neutrophil % : x  Auto Lymphocyte % : x  Auto Monocyte % : x  Auto Eosinophil % : x  Auto Basophil % : x        128<L>  |  92<L>  |  47<H>  ----------------------------<  440<H>  3.7   |  17<L>  |  2.17<H>    Ca    7.7<L>      2021 04:16  Phos  4.9       Mg     2.1         TPro  5.6<L>  /  Alb  1.4<L>  /  TBili  2.5<H>  /  DBili  x   /  AST  267<H>  /  ALT  133<H>  /  AlkPhos  185<H>      PT/INR - ( 2021 10:59 )   PT: 18.1 sec;   INR: 1.55 ratio         PTT - ( 2021 10:59 )  PTT:30.5 sec  Urinalysis Basic - ( 2021 21:41 )    Color: Agata / Appearance: Clear / S.025 / pH: x  Gluc: x / Ketone: Trace  / Bili: Moderate / Urobili: 4   Blood: x / Protein: 30 mg/dL / Nitrite: Positive   Leuk Esterase: Trace / RBC: 2-5 /HPF / WBC 6-10 /HPF   Sq Epi: x / Non Sq Epi: Few /HPF / Bacteria: TNTC /HPF      Culture Results:   Growth in aerobic and anaerobic bottles: Gram Variable Rods  ***Blood Panel PCR results on this specimen are available  approximately 3 hours after the Gram stain result.***  Gram stain, PCR, and/or culture results may not always  correspond due todifference in methodologies.  ************************************************************  This PCR assay was performed by multiplex PCR. This  Assay tests for 66 bacterial and resistance gene targets.  Please refer to the [facility] testdirectory  at [email or web address] for details. ( @ 22:16)  Culture Results:   Growth in aerobic and anaerobic bottles: Gram Variable Rods ( @ 22:00)      RADIOLOGY & ADDITIONAL STUDIES REVIEWED:  ***    GOALS OF CARE DISCUSSION WITH PATIENT/FAMILY/PROXY:    CRITICAL CARE TIME SPENT: 35 minutes

## 2021-07-23 NOTE — CONSULT NOTE ADULT - SUBJECTIVE AND OBJECTIVE BOX
[facility] NEPHROLOGY- CONSULTATION NOTE    Patient is a 66yo Male with no PMH with poor medical f/u p/w abd pain and diarrhea x 10 days. Pt found to have Liver abscess with nodular like mass on lungs suspicious for septic emboli on CT. Nephrology consulted for Elevated serum creatinine with decreased urine o/p.     Pt intubated and sedated; unable to obtain history from patient.   As per H& P, pt c/o abdominal pain and diarrhea x 10 days, with associated low grade fever, diaphoresis, weight loss with SOB.   Pt intubated for respiratory distress. Pt had a CT abd/pelvis with IV contrast today to confirm liver abscess. s/p IR liver abscess drainage with 12 Fr pigtail catheter placement    PAST MEDICAL & SURGICAL HISTORY:  No pertinent past medical history      No Known Allergies    Home Medications Reviewed  Hospital Medications:   MEDICATIONS  (STANDING):  chlorhexidine 2% Cloths 1 Application(s) Topical daily  dextrose 5%. 1000 milliLiter(s) (100 mL/Hr) IV Continuous <Continuous>  glucagon  Injectable 1 milliGRAM(s) IntraMuscular once  heparin   Injectable 5000 Unit(s) SubCutaneous every 12 hours  hydrocortisone sodium succinate Injectable 100 milliGRAM(s) IV Push every 8 hours  insulin lispro (ADMELOG) corrective regimen sliding scale   SubCutaneous every 6 hours  norepinephrine Infusion 0.3 MICROgram(s)/kG/Min (23.8 mL/Hr) IV Continuous <Continuous>  pantoprazole  Injectable 40 milliGRAM(s) IV Push daily  phenylephrine    Infusion 0.3 MICROgram(s)/kG/Min (4.76 mL/Hr) IV Continuous <Continuous>  piperacillin/tazobactam IVPB.. 3.375 Gram(s) IV Intermittent every 8 hours  propofol Infusion 10 MICROgram(s)/kG/Min (5.08 mL/Hr) IV Continuous <Continuous>  rocuronium Injectable 70 milliGRAM(s) IV Push once  sodium bicarbonate  Infusion 0.222 mEq/kG/Hr (125 mL/Hr) IV Continuous <Continuous>  vasopressin Infusion 0.04 Unit(s)/Min (2.4 mL/Hr) IV Continuous <Continuous>    FAMILY HISTORY:  FH: stroke (Mother)        REVIEW OF SYSTEMS: Unable to be obtained: intubated and sedated    VITALS:  T(F): 97.2 (21 @ 15:00), Max: 98.7 (21 @ 22:45)  HR: 130 (21 @ 14:57)  BP: 108/68 (21 @ 12:30)  RR: 23 (21 @ 12:30)  SpO2: 95% (21 @ 14:57)  Wt(kg): --     @ 07:01  -   @ 07:00  --------------------------------------------------------  IN: 1500 mL / OUT: 0 mL / NET: 1500 mL     @ 07:01  -   @ 15:53  --------------------------------------------------------  IN: 160.8 mL / OUT: 400 mL / NET: -239.2 mL      Height (cm): 185.4 ( @ 17:35)  Weight (kg): 84.6 ( @ 03:00)  BMI (kg/m2): 24.6 ( @ 03:00)  BSA (m2): 2.09 ( @ 03:00)    PHYSICAL EXAM:  Gen: Sedated  HEENT: +ETT  Neck: no JVD  Cards: tachy , +S1/S2,  Resp: +mechanical BS; overall clear  GI: soft, mild distention  +RUQ drain  : +bridges with dark urine  Extremities: no LE edema B/L  Derm: no rashes  Neuro: sedated    LABS:      138  |  98  |  52<H>  ----------------------------<  265<H>  3.5   |  17<L>  |  2.27<H>    Ca    7.8<L>      2021 10:59  Phos  4.9       Mg     2.1         TPro  4.6<L>  /  Alb  1.1<L>  /  TBili  1.7<H>  /  DBili      /  AST  264<H>  /  ALT  123<H>  /  AlkPhos  140<H>      Creatinine Trend: 2.27 <--, 2.17 <--, 2.05 <--, 0.98 <--, 1.00 <--                        12.9   2.66  )-----------( 160      ( 2021 10:59 )             40.4     Urine Studies:  Urinalysis Basic - ( 2021 21:41 )    Color: Agata / Appearance: Clear / S.025 / pH:   Gluc:  / Ketone: Trace  / Bili: Moderate / Urobili: 4   Blood:  / Protein: 30 mg/dL / Nitrite: Positive   Leuk Esterase: Trace / RBC: 2-5 /HPF / WBC 6-10 /HPF   Sq Epi:  / Non Sq Epi: Few /HPF / Bacteria: TNTC /HPF        RADIOLOGY & ADDITIONAL STUDIES:    < from: CT Abdomen and Pelvis w/wo IV Cont (21 @ 13:15) >    EXAM:  CT ABDOMEN AND PELVIS WAW IC                            PROCEDURE DATE:  2021      < from: CT Abdomen and Pelvis w/wo IV Cont (21 @ 13:15) >  KIDNEYS/URETERS: Within normal limits.    < end of copied text >  < from: CT Abdomen and Pelvis w/wo IV Cont (21 @ 13:15) >    IMPRESSION:  Previously seen hepatic mass is now a collection of air and mottled soft tissue, either abscess or necrosis/infarction. The process has extended through the capsule resulting pneumoperitoneum. Consider etiologies include infarction and/or infection.    Occluded middle hepatic vein.    Thick-walled jejunum, either infectious or ischemic.      < end of copied text >    < end of copied text >    < from: CT Abdomen and Pelvis No Cont (21 @ 21:32) >    EXAM:  CT ABDOMEN AND PELVIS                            PROCEDURE DATE:  2021        < end of copied text >  < from: CT Abdomen and Pelvis No Cont (21 @ 21:32) >    IMPRESSION:  Numerous new nodules and nodular masslike consolidations in the lung bases, several of which demonstrate central cavitation. Given rapid development of these lesions, primary concern is for septic emboli. Small bilateral pleural effusions.    Nodularity of the surface contour of the liver concerning first row cyst. Ill-defined low-attenuation lesion in the right hepatic lobe as seen on CT of the chest measuring up to 9.0 x 6.2 cm with new foci of air which may represent abscess or superinfection of underlying liver mass. Liver protocol MRI is recommended for further evaluation.    Small volume of abdominal and pelvic ascites.    No bowel obstruction. Evaluation of bowel wall limited by [intravenous contrast. Apparent thickening of segments of the large bowel including the cecum is likely due to underdistention.    < end of copied text >

## 2021-07-23 NOTE — PROCEDURE NOTE - NSINDICATIONS_GEN_A_CORE
critical illness/emergency venous access/venous access/volume resuscitation
arterial puncture to obtain ABG's/critical patient/monitoring purposes

## 2021-07-23 NOTE — CHART NOTE - NSCHARTNOTEFT_GEN_A_CORE
Patient's ABG noted to be severe metabolic acidosis with incomplete respiratory compensation. Patient at bedside appears drowsy with sporadic breathing, wakes up on touch. BP soft with SBP in 80s. 3 amps of bicarb given with minimal improvement in BP. Patient started on peripheral phenylephrine and right femoral central line placed for pressors. Patient intubated thereafter. I discussed the case with IR for drainage of liver abscess. IR requires confirmation of liver abscess (r/o vascular/ malignant lesion), hence patient will undergo CT abdo/pelvis w contrast urgently. Patient with NANCY and poor urine output related to severe sepsis. We discussed with patient's family (son, beatriz), the possibility of contrast induced nephropathy and worsening renal failure which may require dialysis. Discussed with details risks/benefits/alternatives to CT abdo w contrast and they agree to proceed with imaging. Patient's ABG noted to be severe metabolic acidosis with incomplete respiratory compensation. Patient at bedside appears drowsy with sporadic breathing, wakes up on touch. BP soft with SBP in 80s. 3 amps of bicarb given with minimal improvement in BP. Patient started on peripheral phenylephrine and right femoral central line placed for pressors. Patient intubated thereafter. I discussed the case with IR for drainage of liver abscess. IR requires confirmation of liver abscess (r/o vascular/ malignant lesion), hence patient will undergo CT abdo/pelvis w contrast urgently. Patient with NANCY and poor urine output related to severe sepsis. We discussed with patient's family (son, beatriz and brother katy), the possibility of contrast induced nephropathy and worsening renal failure which may require dialysis. Discussed with details risks/benefits/alternatives to CT abdo w contrast and they agree to proceed with imaging.

## 2021-07-23 NOTE — CONSULT NOTE ADULT - SUBJECTIVE AND OBJECTIVE BOX
HPI:  Patient is a 64 y/o male from home, ambulates independently, lives with family with no significant PMH who presents to the ED with a chief complaint of abdominal pain and diarrhea x 10 days. It is associated with nausea, dizziness, low grade fever, profusely sweating, loss of appetite, weight loss, dry cough, shortness of breath, not eating, weight loss. He received Moderna vaccine in April. He had flu like symptoms few days back. Patient denies recent travel, sick contacts, exposure to TB, not sexually active, not on any recent antibiotics. He visited ED 2 days back for abdominal pain. He doesn't see his PMD for 10 yrs.  [facility] Full code (2021 23:21)    REVIEW OF SYSTEMS:  [  ] Not able to elicit  General:	  Chest:	  GI:	  :  Skin:	  Musculoskeletal:	  Neuro:    PAST MEDICAL & SURGICAL HISTORY:  No pertinent past medical history      ALLERGIES: No Known Allergies    MEDS:  chlorhexidine 2% Cloths 1 Application(s) Topical daily  dextrose 5%. 1000 milliLiter(s) IV Continuous <Continuous>  glucagon  Injectable 1 milliGRAM(s) IntraMuscular once  heparin   Injectable 5000 Unit(s) SubCutaneous every 12 hours  insulin lispro (ADMELOG) corrective regimen sliding scale   SubCutaneous every 6 hours  norepinephrine Infusion 0.3 MICROgram(s)/kG/Min IV Continuous <Continuous>  pantoprazole  Injectable 40 milliGRAM(s) IV Push daily  phenylephrine    Infusion 0.3 MICROgram(s)/kG/Min IV Continuous <Continuous>  piperacillin/tazobactam IVPB.. 3.375 Gram(s) IV Intermittent every 8 hours  propofol Infusion 10 MICROgram(s)/kG/Min IV Continuous <Continuous>  rocuronium Injectable 70 milliGRAM(s) IV Push once  sodium bicarbonate  Infusion 0.222 mEq/kG/Hr IV Continuous <Continuous>  vasopressin Infusion 0.04 Unit(s)/Min IV Continuous <Continuous>    SOCIAL HISTORY:  Smoker:      FAMILY HISTORY:  FH: stroke (Mother)      VITALS:  Vital Signs Last 24 Hrs  T(C): 35.3 (2021 11:30), Max: 37.1 (2021 22:45)  T(F): 95.5 (2021 11:30), Max: 98.7 (2021 22:45)  HR: 124 (2021 12:30) (96 - 128)  BP: 108/68 (2021 12:30) (65/38 - 160/84)  BP(mean): 75 (2021 12:30) (42 - 101)  RR: 23 (2021 12:30) (14 - 42)  SpO2: 96% (2021 12:30) (89% - 99%)      PHYSICAL EXAM:  Constitutional:  HEENT:  Neck:  Respiratory:  Cardiovascular:  Gastrointestinal:  Extremities:  Skin:  Ortho:  Neuro:      LABS/DIAGNOSTIC TESTS:                        12.9   2.66  )-----------( 160      ( 2021 10:59 )             40.4     WBC Count: 2.66 K/uL ( @ 10:59)  WBC Count: 3.80 K/uL ( @ 04:16)  WBC Count: 15.99 K/uL ( @ 17:58)        138  |  98  |  52<H>  ----------------------------<  265<H>  3.5   |  17<L>  |  2.27<H>    Ca    7.8<L>      2021 10:59  Phos  4.9       Mg     2.1         TPro  4.6<L>  /  Alb  1.1<L>  /  TBili  1.7<H>  /  DBili  x   /  AST  264<H>  /  ALT  123<H>  /  AlkPhos  140<H>      Urinalysis Basic - ( 2021 21:41 )    Color: Agata / Appearance: Clear / S.025 / pH: x  Gluc: x / Ketone: Trace  / Bili: Moderate / Urobili: 4   Blood: x / Protein: 30 mg/dL / Nitrite: Positive   Leuk Esterase: Trace / RBC: 2-5 /HPF / WBC 6-10 /HPF   Sq Epi: x / Non Sq Epi: Few /HPF / Bacteria: TNTC /HPF      LIVER FUNCTIONS - ( 2021 10:59 )  Alb: 1.1 g/dL / Pro: 4.6 g/dL / ALK PHOS: 140 U/L / ALT: 123 U/L DA / AST: 264 U/L / GGT: x           PT/INR - ( 2021 10:59 )   PT: 18.1 sec;   INR: 1.55 ratio         PTT - ( 2021 10:59 )  PTT:30.5 sec  Lactate, Blood: 14.9 mmol/L ( @ 10:59)  Lactate, Blood: 11.6 mmol/L ( @ 04:16)  Lactate, Blood: 11.5 mmol/L ( @ 22:46)  Lactate, Blood: 10.9 mmol/L ( @ 19:13)    ABG - ABG - ( 2021 09:59 )  pH, Arterial: 7.15  pH, Blood: x     /  pCO2: 29    /  pO2: 72    / HCO3: 10    / Base Excess: -17.4 /  SaO2: 93                  CULTURES:   .Blood Blood-Peripheral   @ 22:16   Growth in aerobic and anaerobic bottles: Gram Variable Rods  ***Blood Panel PCR results on this specimen are available  approximately 3 hours after the Gram stain result.***  Gram stain, PCR, and/or culture results may not always  correspond due todifference in methodologies.  ************************************************************  This PCR assay was performed by multiplex PCR. This  Assay tests for 66 bacterial and resistance gene targets.  Please refer to the [facility] Labs testdirectory  at [email or web address] for details.  --  Blood Culture PCR      .Blood Blood-Peripheral   @ 22:00   Growth in aerobic and anaerobic bottles: Gram Variable Rods  --    Growth in aerobic and anaerobic bottles: Gram Variable Rods          RADIOLOGY:  < from: CT Abdomen and Pelvis No Cont (21 @ 21:32) >    EXAM:  CT ABDOMEN AND PELVIS                            PROCEDURE DATE:  2021          INTERPRETATION:  CLINICAL INFORMATION: Diffuse abdominal pain    COMPARISON: CTA of the chest from 2021.    CONTRAST/COMPLICATIONS:  IV Contrast: NONE  Oral Contrast: NONE  Complications: None reported at time of study completion    PROCEDURE:  CT of the Abdomen and Pelvis was performed.  Sagittal and coronal reformats were performed.    FINDINGS:  LOWER CHEST: Numerous new nodules and nodular masslike consolidations in the lung bases, several of which demonstrate central cavitation. Small bilateral pleural effusions. Heart is mildly enlarged. Aortic valve leaflet and mitral annular calcifications. Mild coronary artery atherosclerotic calcifications.    LIVER: Nodularity of the surface contour of the liver. Ill-defined low-attenuation lesion in the right hepatic lobe as seen on the CTA of the chest measuring up to 9.0 x 6.2 cm with new foci of internal air.  BILE DUCTS: Normal caliber.  GALLBLADDER: High attenuation within the gallbladder may represent combination of sludge and vicarious excretion of contrast from recent CTA.  SPLEEN: Within normal limits.  PANCREAS: Within normal limits.  ADRENALS: Within normal limits.  KIDNEYS/URETERS: Kidneys enhance symmetrically without hydronephrosis. Nonobstructing lower pole left intrarenal calculus measuring up to 4 mm. Mild nonspecific bilateral perinephric stranding.    BLADDER: Underdistended.  REPRODUCTIVE ORGANS: Prostate gland is notenlarged.    BOWEL: No bowel obstruction. Evaluation of the bowel wall is limited by lack of of intravenous contrast. Apparent thickening of segments of the large bowel including the cecum is likely due to underdistention.  PERITONEUM: Small volume of abdominal and pelvic ascites. Scattered prominent subcentimeter mesenteric root lymph nodes.  VESSELS: Atherosclerotic calcifications of the aortoiliac tree. Normal caliber abdominal aorta.  RETROPERITONEUM/LYMPH NODES: No lymphadenopathy.  ABDOMINAL WALL: Tiny fat-containing umbilical hernia.  BONES: Degenerative changes of the spine.    IMPRESSION:  Numerous new nodules and nodular masslike consolidations in the lung bases, several of which demonstrate central cavitation. Given rapid development of these lesions, primary concern is for septic emboli. Small bilateral pleural effusions.    Nodularity of the surface contour of the liver concerning first row cyst. Ill-defined low-attenuation lesion in the right hepatic lobe as seen on CT of the chest measuring up to 9.0 x 6.2 cm with new foci of air which may represent abscess or superinfection of underlying liver mass. Liver protocol MRI is recommended for further evaluation.    Small volume of abdominal and pelvic ascites.    No bowel obstruction. Evaluation of bowel wall limited by [intravenous contrast. Apparent thickening of segments of the large bowel including the cecum is likely due to underdistention.    --- End of Report ---          < end of copied text >   HPI:  Patient is a 66 y/o male from home, ambulates independently, lives with family with no significant PMH who presents to the ED with a chief complaint of abdominal pain and diarrhea x 10 days. It is associated with nausea, dizziness, low grade fever, profusely sweating, loss of appetite, weight loss, dry cough, shortness of breath, not eating, weight loss. He received Moderna vaccine in April. He had flu like symptoms few days back. Patient denies recent travel, sick contacts, exposure to TB, not sexually active, not on any recent antibiotics. He visited ED 2 days back for abdominal pain. He doesn't see his PMD for 10 yrs.  [facility] Full code (2021 23:21)    History as above, patient admitted from home for abdominal pain and diarrhea x 10 days.  Patient was seen in the ICU and is s/p intubation earlier today for respiratory distress.  He is currently on FIO2 of 100% with an oxygen saturation of 98% and is on 2 pressors.  CT shows multiple nodular lesions of the lung base with cavitation with a 9.0 x 6.2 cm right hepatic lobe abscess and is s/p IR guided drainage of abscess today.  He is RSV positive and blood cultures are positive for Klebsiella.  He remains afebrile and WBC count is WNL.    REVIEW OF SYSTEMS:  [ x ] Not able to elicit      PAST MEDICAL & SURGICAL HISTORY:  No pertinent past medical history      ALLERGIES: No Known Allergies    MEDS:  chlorhexidine 2% Cloths 1 Application(s) Topical daily  dextrose 5%. 1000 milliLiter(s) IV Continuous <Continuous>  glucagon  Injectable 1 milliGRAM(s) IntraMuscular once  heparin   Injectable 5000 Unit(s) SubCutaneous every 12 hours  insulin lispro (ADMELOG) corrective regimen sliding scale   SubCutaneous every 6 hours  norepinephrine Infusion 0.3 MICROgram(s)/kG/Min IV Continuous <Continuous>  pantoprazole  Injectable 40 milliGRAM(s) IV Push daily  phenylephrine    Infusion 0.3 MICROgram(s)/kG/Min IV Continuous <Continuous>  piperacillin/tazobactam IVPB.. 3.375 Gram(s) IV Intermittent every 8 hours  propofol Infusion 10 MICROgram(s)/kG/Min IV Continuous <Continuous>  rocuronium Injectable 70 milliGRAM(s) IV Push once  sodium bicarbonate  Infusion 0.222 mEq/kG/Hr IV Continuous <Continuous>  vasopressin Infusion 0.04 Unit(s)/Min IV Continuous <Continuous>    SOCIAL HISTORY:  Smoker:  Unable to elicit    FAMILY HISTORY:  FH: stroke (Mother)      VITALS:  Vital Signs Last 24 Hrs  T(C): 35.3 (2021 11:30), Max: 37.1 (2021 22:45)  T(F): 95.5 (2021 11:30), Max: 98.7 (2021 22:45)  HR: 124 (2021 12:30) (96 - 128)  BP: 108/68 (2021 12:30) (65/38 - 160/84)  BP(mean): 75 (2021 12:30) (42 - 101)  RR: 23 (2021 12:30) (14 - 42)  SpO2: 96% (2021 12:30) (89% - 99%)      PHYSICAL EXAM:  Constitutional: Well developed, well groomed, no distress  HEENT: ETT  Neck: supple no LN's no JVD  Respiratory: Bilateral rales  Cardiovascular: Regular rate and rhythm, no murmurs  Gastrointestinal: +BS, normal, soft, nontender, nondistended, no guarding, no rigidity, no organomegaly, RUQ drain in place with serous drainage  : Nixon catheter in place with kaye urine  Extremities: no edema, no cyanosis, no clubbing, Right fem line  Skin: no rashes  Ortho: no jt swelling  Neuro: Intubated and sedated        LABS/DIAGNOSTIC TESTS:                        12.9   2.66  )-----------( 160      ( 2021 10:59 )             40.4     WBC Count: 2.66 K/uL ( @ 10:59)  WBC Count: 3.80 K/uL ( @ 04:16)  WBC Count: 15.99 K/uL ( @ 17:58)        138  |  98  |  52<H>  ----------------------------<  265<H>  3.5   |  17<L>  |  2.27<H>    Ca    7.8<L>      2021 10:59  Phos  4.9       Mg     2.1         TPro  4.6<L>  /  Alb  1.1<L>  /  TBili  1.7<H>  /  DBili  x   /  AST  264<H>  /  ALT  123<H>  /  AlkPhos  140<H>      Urinalysis Basic - ( 2021 21:41 )    Color: Kaye / Appearance: Clear / S.025 / pH: x  Gluc: x / Ketone: Trace  / Bili: Moderate / Urobili: 4   Blood: x / Protein: 30 mg/dL / Nitrite: Positive   Leuk Esterase: Trace / RBC: 2-5 /HPF / WBC 6-10 /HPF   Sq Epi: x / Non Sq Epi: Few /HPF / Bacteria: TNTC /HPF      LIVER FUNCTIONS - ( 2021 10:59 )  Alb: 1.1 g/dL / Pro: 4.6 g/dL / ALK PHOS: 140 U/L / ALT: 123 U/L DA / AST: 264 U/L / GGT: x           PT/INR - ( 2021 10:59 )   PT: 18.1 sec;   INR: 1.55 ratio         PTT - ( 2021 10:59 )  PTT:30.5 sec  Lactate, Blood: 14.9 mmol/L ( @ 10:59)  Lactate, Blood: 11.6 mmol/L ( @ 04:16)  Lactate, Blood: 11.5 mmol/L ( @ 22:46)  Lactate, Blood: 10.9 mmol/L ( @ 19:13)    ABG - ABG - ( 2021 09:59 )  pH, Arterial: 7.15  pH, Blood: x     /  pCO2: 29    /  pO2: 72    / HCO3: 10    / Base Excess: -17.4 /  SaO2: 93                  CULTURES:   .Blood Blood-Peripheral   @ 22:16   Growth in aerobic and anaerobic bottles: Gram Variable Rods  ***Blood Panel PCR results on this specimen are available  approximately 3 hours after the Gram stain result.***  Gram stain, PCR, and/or culture results may not always  correspond due todifference in methodologies.  ************************************************************  This PCR assay was performed by multiplex PCR. This  Assay tests for 66 bacterial and resistance gene targets.  Please refer to the [facility] Labs testdirectory  at [email or web address] for details.  --  Blood Culture PCR      .Blood Blood-Peripheral   @ 22:00   Growth in aerobic and anaerobic bottles: Gram Variable Rods  --    Growth in aerobic and anaerobic bottles: Gram Variable Rods          RADIOLOGY:  < from: CT Abdomen and Pelvis No Cont (21 @ 21:32) >    EXAM:  CT ABDOMEN AND PELVIS                            PROCEDURE DATE:  2021          INTERPRETATION:  CLINICAL INFORMATION: Diffuse abdominal pain    COMPARISON: CTA of the chest from 2021.    CONTRAST/COMPLICATIONS:  IV Contrast: NONE  Oral Contrast: NONE  Complications: None reported at time of study completion    PROCEDURE:  CT of the Abdomen and Pelvis was performed.  Sagittal and coronal reformats were performed.    FINDINGS:  LOWER CHEST: Numerous new nodules and nodular masslike consolidations in the lung bases, several of which demonstrate central cavitation. Small bilateral pleural effusions. Heart is mildly enlarged. Aortic valve leaflet and mitral annular calcifications. Mild coronary artery atherosclerotic calcifications.    LIVER: Nodularity of the surface contour of the liver. Ill-defined low-attenuation lesion in the right hepatic lobe as seen on the CTA of the chest measuring up to 9.0 x 6.2 cm with new foci of internal air.  BILE DUCTS: Normal caliber.  GALLBLADDER: High attenuation within the gallbladder may represent combination of sludge and vicarious excretion of contrast from recent CTA.  SPLEEN: Within normal limits.  PANCREAS: Within normal limits.  ADRENALS: Within normal limits.  KIDNEYS/URETERS: Kidneys enhance symmetrically without hydronephrosis. Nonobstructing lower pole left intrarenal calculus measuring up to 4 mm. Mild nonspecific bilateral perinephric stranding.    BLADDER: Underdistended.  REPRODUCTIVE ORGANS: Prostate gland is notenlarged.    BOWEL: No bowel obstruction. Evaluation of the bowel wall is limited by lack of of intravenous contrast. Apparent thickening of segments of the large bowel including the cecum is likely due to underdistention.  PERITONEUM: Small volume of abdominal and pelvic ascites. Scattered prominent subcentimeter mesenteric root lymph nodes.  VESSELS: Atherosclerotic calcifications of the aortoiliac tree. Normal caliber abdominal aorta.  RETROPERITONEUM/LYMPH NODES: No lymphadenopathy.  ABDOMINAL WALL: Tiny fat-containing umbilical hernia.  BONES: Degenerative changes of the spine.    IMPRESSION:  Numerous new nodules and nodular masslike consolidations in the lung bases, several of which demonstrate central cavitation. Given rapid development of these lesions, primary concern is for septic emboli. Small bilateral pleural effusions.    Nodularity of the surface contour of the liver concerning first row cyst. Ill-defined low-attenuation lesion in the right hepatic lobe as seen on CT of the chest measuring up to 9.0 x 6.2 cm with new foci of air which may represent abscess or superinfection of underlying liver mass. Liver protocol MRI is recommended for further evaluation.    Small volume of abdominal and pelvic ascites.    No bowel obstruction. Evaluation of bowel wall limited by [intravenous contrast. Apparent thickening of segments of the large bowel including the cecum is likely due to underdistention.    --- End of Report ---          < end of copied text >

## 2021-07-23 NOTE — CONSULT NOTE ADULT - SUBJECTIVE AND OBJECTIVE BOX
General Surgery Consultation Note    Patient is a 65y old  Male who presents with a chief complaint of Sepsis (2021 15:53)      INTERVAL HPI:  General surgery consulted to see this 66 y/o male with liver abscess.  Pt intubated and unable to provide history. Chart review reveals abdominal pain and diarrhea x 10 days.  Pain was associated with nausea, dizziness, low grade fever, profuse sweating, loss of appetite, weight loss, dry cough and shortness of breath. Pt had CT of the abdomen which showed multiple nodular lesions of the lung base with cavitation ( concerning for septic emboli ) and a low attenuation lesion of the liver, possible abscess 9x6cm. On admission, lactate 11. Pt was intubated on . He was sent to IR for drainage of liver abscess.  General Surgery consulted for possible surgical intervention for gross spillage from liver abscess.      PAST MEDICAL & SURGICAL HISTORY:  No pertinent past medical history        Allergies    No Known Allergies    Intolerances        MEDICATIONS  (STANDING):  chlorhexidine 2% Cloths 1 Application(s) Topical daily  dextrose 5%. 1000 milliLiter(s) (100 mL/Hr) IV Continuous <Continuous>  glucagon  Injectable 1 milliGRAM(s) IntraMuscular once  heparin   Injectable 5000 Unit(s) SubCutaneous every 12 hours  hydrocortisone sodium succinate Injectable 100 milliGRAM(s) IV Push every 8 hours  insulin lispro (ADMELOG) corrective regimen sliding scale   SubCutaneous every 6 hours  norepinephrine Infusion 0.3 MICROgram(s)/kG/Min (23.8 mL/Hr) IV Continuous <Continuous>  pantoprazole  Injectable 40 milliGRAM(s) IV Push daily  piperacillin/tazobactam IVPB.. 3.375 Gram(s) IV Intermittent every 8 hours  propofol Infusion 10 MICROgram(s)/kG/Min (5.08 mL/Hr) IV Continuous <Continuous>  sodium bicarbonate  Infusion 0.222 mEq/kG/Hr (125 mL/Hr) IV Continuous <Continuous>  vasopressin Infusion 0.04 Unit(s)/Min (2.4 mL/Hr) IV Continuous <Continuous>    MEDICATIONS  (PRN):      Vital Signs Last 24 Hrs  T(C): 36.2 (2021 15:00), Max: 37.1 (2021 22:45)  T(F): 97.2 (2021 15:00), Max: 98.7 (2021 22:45)  HR: 130 (2021 16:19) (96 - 132)  BP: 101/76 (2021 16:00) (65/38 - 160/84)  BP(mean): 83 (2021 16:00) (42 - 101)  RR: 30 (2021 16:15) (14 - 42)  SpO2: 93% (2021 16:19) (89% - 99%)    Physical Exam:  Gen: intubated  HEENT: anicteric  Abd: RUQ drain in place with dark serous output. Soft, unable to assess tenderness, no masses or lesions palpated  Ext: warm to touch no c/c/e    Labs:                          14.7   3.37  )-----------( 185      ( 2021 16:03 )             47.6     07-23    137  |  99  |  57<H>  ----------------------------<  177<H>  3.6   |  16<L>  |  2.71<H>    Ca    7.9<L>      2021 16:03  Phos  6.9     07-23  Mg     2.4     07-23    TPro  5.2<L>  /  Alb  1.2<L>  /  TBili  1.9<H>  /  DBili  x   /  AST  803<H>  /  ALT  240<H>  /  AlkPhos  174<H>  0723    PT/INR - ( 2021 10:59 )   PT: 18.1 sec;   INR: 1.55 ratio         PTT - ( 2021 10:59 )  PTT:30.5 sec  Urinalysis Basic - ( 2021 21:41 )    Color: Agata / Appearance: Clear / S.025 / pH: x  Gluc: x / Ketone: Trace  / Bili: Moderate / Urobili: 4   Blood: x / Protein: 30 mg/dL / Nitrite: Positive   Leuk Esterase: Trace / RBC: 2-5 /HPF / WBC 6-10 /HPF   Sq Epi: x / Non Sq Epi: Few /HPF / Bacteria: TNTC /HPF        Cultures:  Culture - Blood (21 @ 22:16)   - Klebsiella pneumoniae: Detec   Radiological Exams:    ct< from: CT Angio Chest PE Protocol w/ IV Cont (21 @ 21:52) >  IMPRESSION:    No pulmonary embolism.    The liver is enlarged and has a nodular contour. Question cirrhosis. There is a 6.5 x 6.1 sagittal area of hypodensity in the right lobe which may represent a mass. Evaluation is limited on this early phase arterial exam. Recommend dedicated hepatic imaging, preferably contrast-enhanced MRI, for further evaluation.    Enlarged periportal lymph node measuring 2.6 x 1.9 cm (5:162).    < end of copied text >    < from: CT Abdomen and Pelvis No Cont (21 @ 21:32) >  IMPRESSION:  Numerous new nodules and nodular masslike consolidations in the lung bases, several of which demonstrate central cavitation. Given rapid development of these lesions, primary concern is for septic emboli. Small bilateral pleural effusions.    Nodularity of the surface contour of the liver concerning first row cyst. Ill-defined low-attenuation lesion in the right hepatic lobe as seen on CT of the chest measuring up to 9.0 x 6.2 cm with new foci of air which may represent abscess or superinfection of underlying liver mass. Liver protocol MRI is recommended for further evaluation.    Small volume of abdominal and pelvic ascites.    No bowel obstruction. Evaluation of bowel wall limited by [intravenous contrast. Apparent thickening of segments of the large bowel including the cecum is likely due to underdistention.    < end of copied text >    < from: CT Abdomen and Pelvis w/wo IV Cont (21 @ 13:15) >  IMPRESSION:  Previously seen hepatic mass is now a collection of air and mottled soft tissue, either abscess or necrosis/infarction. The process has extended through the capsule resulting pneumoperitoneum. Consider etiologies include infarction and/or infection.    Occluded middle hepatic vein.    Thick-walled jejunum, either infectious or ischemic.    Increasing ascites.    Increasing pleural effusions and bibasilar atelectasis versus infiltrate. Bilateral cavitary lung nodules, likely septic emboli, reidentified.      < end of copied text >  < from: CT Drain Liver Fluid Collection (21 @ 14:38) >  IMPRESSION: SUCCESSFUL CT-GUIDED PERCUTANEOUS DRAINAGE OF HEPATIC ABSCESS/NECROTIC CAVITY AS DESCRIBED ABOVE.      < end of copied text >

## 2021-07-23 NOTE — PROGRESS NOTE ADULT - ATTENDING COMMENTS
Assessment:  1. Septic shock  2. Klebsiella Bacteremia   3. Acute respiratory failure  4. Lactic acidosis with high anion gap metabolic acidosis   5. Acute renal failure  6. Liver cyst - mass vs abscess   7. Multiple pulmonary nodules - suspect infectious emboli  8. Transaminitis   9. RSV    Plan  - Patient urgently intubated and placed on mechanical ventilation  - Sedation for vent synchrony   - Broad spectrum antibiotics  - Follow up cultures  - ID consult  - Sodium bicarb infusion  - Nephrology consult for HD today  - IR consulted, requesting CT scan with IV contrast to better define liver collection, may need drainage   - Monitor hemodynamics, will insert arterial line   - Vasopressor support to maintain MAP > 65%   - Repeat labs in PM  - DVT and stress ulcer prophylaxis  - Overall prognosis is guarded given multiple system organ dysfunction Assessment:  1. Septic shock  2. Klebsiella Bacteremia   3. Acute respiratory failure  4. Lactic acidosis with high anion gap metabolic acidosis   5. Acute renal failure  6. Liver abscess  7. Multiple pulmonary nodules - suspect infectious emboli  8. Transaminitis   9. RSV    Plan  - Patient urgently intubated and placed on mechanical ventilation  - Sedation for vent synchrony   - Broad spectrum antibiotics  - Follow up cultures  - ID consult  - Sodium bicarb infusion  - Nephrology consult for HD today  - IR consulted, requesting CT scan with IV contrast to better define liver collection, may need drainage   - Monitor hemodynamics, will insert arterial line   - Vasopressor support to maintain MAP > 65%   - Repeat labs in PM  - DVT and stress ulcer prophylaxis  - Overall prognosis is guarded given multiple system organ dysfunction

## 2021-07-23 NOTE — PROCEDURE NOTE - NSINFORMCONSENT_GEN_A_CORE
patient's brother Marquez/Benefits, risks, and possible complications of procedure explained to patient/caregiver who verbalized understanding and gave verbal consent.
This was an emergent procedure.
This was an emergent procedure.

## 2021-07-23 NOTE — CONSULT NOTE ADULT - ASSESSMENT
Patient is a 64yo Male with no PMH (poor medical f/u) p/w abd pain and diarrhea x 10 days a/w Septic shock 2/2 liver abscess with concerns of septic emboli, hypotension on 2 pressors, acute respiratory distress s/p intubated, NANCY, and metabolic acidosis. Nephrology consulted for elevated serum creatinine with decreased urine o/p.     1. NANCY- likely ATN in the setting of septic shock/ hypotension. s/p CT with IV contrast today, will monitor renal function. Active UA. Check UCx and check urine lytes. CT with no hydro. Recc bicarb gtt as below.   Pt not fluid overloaded and no electrolyte abnormalities- no urgent need for HD at this time. Discussed with pt's son Jack, if worsening renal function and/or decline in urine o/p and pt is hemodynamically stable will consider HD. Discussed risk/ benefits/ alt; phone consent given (in chart). Check HepBsAg. Strict I/Os. Avoid nephrotoxins/ NSAIDs/ RCA. Monitor BMP.  2. Septic shock- Liver Abscess, bacteremia with gram variable rods with concerns for septic emboli. Antibiotics/ pressors as per ICU.   3. Hypotension- BP low normal on 2 pressors. Plan as per ICU. Monitor BP  4. Metabolic acidosis- pure anion gap metabolic acidosis in the setting of lactic acidosis. Ph 7.12. Recc D5 with 150 meq bicarb @ 100cc/hr. Monitor ph/ CO2. Repeat BMP  Lactate 14.9- guarded prognosis. Discussed with son Jack Campbell.     Plan discussed with ICU team    [facility] NEPHROLOGY  Roberto Alanis M.D.  [place] Rueda D.O.  Shu Grijalva M.D.  Brii Oden, MSN, ANP-C  [phone]    [address] [place] [address]

## 2021-07-23 NOTE — CONSULT NOTE ADULT - ASSESSMENT
66yo M with septic shock secondary to septic emboli in lungs and liver abscess s/p IR drain today. On pressors Worsening acidosis.   1. NO acute surgical intervention warranted at this time. Pt is not stable for operative management considering current condition  2. Monitor output from drain  3. Cont IV fluids   4. Cont IV antibiotics  5. Care as per ICU  6. Pt seen with Dr. Peacock

## 2021-07-23 NOTE — PROGRESS NOTE ADULT - ASSESSMENT
Patient is a 64 y/o male from home, ambulates independently, lives with family with no significant PMH who presents to the ED with a chief complaint of abdominal pain and diarrhea x 10 days. Admitted to ICU for Sepsis due to sep    Sepsis  Lactic acidosis  Septic emboli  Liver Abscess  Hyponatremia  High anion gap metabolic acidosis  NANCY  Hyperglycemia  Transaminitis    CNS  # AAO x 3   no issues  =====================[CVS ]===============================  # EKG showed Sinus tachycardia.    =====================[RESP ]==============================  # no issues      =====================[ GI ]================================  # Transaminitis LFTs are elevated, likely secondary to Liver abscess  - Continue to trend CMP  f/u acute hepatiits panel  CT Abdomen showed Numerous new nodules and nodular masslike consolidations in the lung bases, several of which demonstrate central cavitation. There is concern for septic emboli. Small bilateral pleural effusions.  Nodularity of the surface contour of the liver concerning first row cyst.   GI Dr Ambriz consulted    ====================[ RENAL ]=============================   # NANCY due to Dehydration vs poor oral intake   BUN/ Cr is 40/2.05 and unknown baseline  f/u Urine lytes, U/A   f/u Ultrasound of kidney  Avoid nephrotoxins  monitor urine output-    Hyponatremia   Sodium of 128  Corrected sodium is 133 for hyperglycemia    =====================[ ID ]================================  Sepsis with Leucocytosis of 16k, lactate of 10.9  s/p 2.5L  s/p zosyn and vancomycin in ED   U/A mildly positive,   f/u Blood and Urine cultures-  started on Zosyn  ID Dr Cardoso consulted    ===================[ HEME/ONC ]===========================  # no issues  =====================[ ENDO ]=============================  # Uncontrolled hyperglycemia  -Anion Gap of 19 due to Lactic acidosis, Blood glucose of 415  s/p regular insulin  Monitor Blood glucose every 6hrs as the pt is npo  ISS for now  f/u Hba1c     MUSCULOSKELETAL   no issues    SKIN  # no issues    ==================[ PROPHYLAXIS ]==========================   # Dvt : Heparin sc   # Gi : Protonix     ====================[ DISPO ]==============================   - Monitor in ICU   GOC Full code   Patient is a 64 y/o male from home, ambulates independently, lives with family with no significant PMH who presents to the ED with a chief complaint of abdominal pain and diarrhea x 10 days. Admitted to ICU for Sepsis due to sep    AHRF   Septic shock  Lactic acidosis  Septic emboli  Liver Abscess  Hyponatremia  High anion gap metabolic acidosis  NANCY  Hyperglycemia  Transaminitis    CNS    # AAO x 3 from baseline  Intubated and sedated   On propofol     =====================[CVS ]===============================  # EKG showed Sinus tachycardia.  f/u ECHO    =====================[RESP ]==============================  # Acute hypoxic hypercapnic respiratory failure sec to septic shock and lactic acidosis  Intubated   Abx with Zosyn   CT Chest with lung nodularities b/l lower lung bases likely septic emboli as compare to prior CT on 7/19      =====================[ GI ]================================  # Transaminitis LFTs are elevated, likely secondary to Liver abscess  - Continue to trend CMP   acute hepatiits panel neg  CT Abdomen showed Numerous new nodules and nodular masslike consolidations in the lung bases, several of which demonstrate central cavitation. There is concern for septic emboli. Small bilateral pleural effusions.  Nodularity of the surface contour of the liver concerning first row cyst.       #Liver abscess  IR guided liver abscess drainage with the tube draining fluid  Culture sent   CT A/P with contrast showed  collection of air and mottled soft tissue, either abscess or necrosis/infarction. The process has extended through the capsule resulting pneumoperitoneum. Consider etiologies include infarction and/or infection. Occluded middle hepatic vein. Thick-walled jejunum, either infectious or ischemic.  Consulted Surgery , no intervention, pt unstable     #No tube feeds for bowel ischemia       ====================[ RENAL ]=============================   # NANCY /ATN sec to septic shock  BUN/ Cr is 40/2.05 and now trending up  Hyperphosphatemia   Nephrology consulted      Hyponatremia   Sodium of 128  Corrected sodium is 133 for hyperglycemia    Anion gap metabolic acidosis  Lactate high 11>14 , LFTs are high too   S/P 4.5lit  S/P 6 bicarb pushes   On bicarb drip    #Anuric   Not making urine   No HD now   Likely need HD in near future       =====================[ ID ]================================  Sepsis with Leucocytosis of 16k, lactate of 10.9 >11>14  s/p 4.5l  s/p zosyn and vancomycin in ED   U/A mildly positive,   Bcx with Klebsiella    started on Zosyn, continue with Zosyn   f/u culture from liver abscess  ID Dr Cardoso consulted    ===================[ HEME/ONC ]===========================  # no issues  =====================[ ENDO ]=============================  # Uncontrolled hyperglycemia  -Anion Gap of 19 due to Lactic acidosis, Blood glucose of 415  Started on insulin drip but discontinued   Monitor Blood glucose every 6hrs as the pt is npo  ISS for now  f/u Hba1c     MUSCULOSKELETAL   no issues    SKIN  # no issues    ==================[ PROPHYLAXIS ]==========================   # Dvt : no dvt ppx due to IR procedure   # Gi : Protonix     ====================[ DISPO ]==============================   - Monitor in ICU   GOC Full code

## 2021-07-23 NOTE — CONSULT NOTE ADULT - SUBJECTIVE AND OBJECTIVE BOX
Patient is a 65y old  Male who presents with a chief complaint of Sepsis (2021 14:18)     .     HPI:            REVIEW OF SYSTEMS  Constitutional:   No fever, no fatigue, no pallor, no night sweats, no weight loss.  HEENT:   No eye pain, no vision changes, no icterus, no mouth ulcers.  Respiratory:   No shortness of breath, no cough, no respiratory distress.   Cardiovascular:   No chest pain, no palpitations.   Gastrointestinal: No abdominal pain, no nausea, no vomiting , no diahrrea, no constipation, no hematochezia,no melena.  Skin:   No rashes, no jaundice, no eczema.   Musculoskeletal:   No joint pain, no swelling, no myalgia.   Neurologic:   No headache, no seizure, no weakness.   Genitourinary:   No dysuria, no decreased urine output.  Psychiatric:  No depression, no anxiety,   Endocrine:   No thyroid disease, no diabetes.  Heme/Lymphatic:   No anemia, no blood transfusions, no lymph node enlargement, no bleeding, no bruising.  ___________________________________________________________________________________________  Allergies    No Known Allergies    Intolerances      MEDICATIONS  (STANDING):  chlorhexidine 2% Cloths 1 Application(s) Topical daily  dextrose 5%. 1000 milliLiter(s) (100 mL/Hr) IV Continuous <Continuous>  glucagon  Injectable 1 milliGRAM(s) IntraMuscular once  heparin   Injectable 5000 Unit(s) SubCutaneous every 12 hours  hydrocortisone sodium succinate Injectable 100 milliGRAM(s) IV Push every 8 hours  insulin lispro (ADMELOG) corrective regimen sliding scale   SubCutaneous every 6 hours  norepinephrine Infusion 0.3 MICROgram(s)/kG/Min (23.8 mL/Hr) IV Continuous <Continuous>  pantoprazole  Injectable 40 milliGRAM(s) IV Push daily  phenylephrine    Infusion 0.3 MICROgram(s)/kG/Min (4.76 mL/Hr) IV Continuous <Continuous>  piperacillin/tazobactam IVPB.. 3.375 Gram(s) IV Intermittent every 8 hours  propofol Infusion 10 MICROgram(s)/kG/Min (5.08 mL/Hr) IV Continuous <Continuous>  rocuronium Injectable 70 milliGRAM(s) IV Push once  sodium bicarbonate  Infusion 0.222 mEq/kG/Hr (125 mL/Hr) IV Continuous <Continuous>  vasopressin Infusion 0.04 Unit(s)/Min (2.4 mL/Hr) IV Continuous <Continuous>    MEDICATIONS  (PRN):      PAST MEDICAL & SURGICAL HISTORY:  No pertinent past medical history      FAMILY HISTORY:  FH: stroke (Mother)      Social History: No hsitory of : Tobacco use, IVDA, EToH  ______________________________________________________________________________________    PHYSICAL EXAM    Daily Height in cm: 185.42 (2021 17:35)    Daily Weight in k (2021 08:00)  BMI: 24.6 ( @ 03:00)  Change in Weight:  Vital Signs Last 24 Hrs  T(C): 35.3 (2021 11:30), Max: 37.1 (2021 22:45)  T(F): 95.5 (2021 11:30), Max: 98.7 (2021 22:45)  HR: 130 (2021 14:57) (96 - 130)  BP: 108/68 (2021 12:30) (65/38 - 160/84)  BP(mean): 75 (2021 12:30) (42 - 101)  RR: 23 (2021 12:30) (14 - 42)  SpO2: 95% (2021 14:57) (89% - 99%)    General:  Well developed, well nourished, alert and active, no pallor, NAD.  HEENT:    Normal appearance of conjunctiva, ears, nose, lips, oropharynx, and oral mucosa, anicteric.  Neck:  No masses, no asymmetry.  Lymph Nodes:  No lymphadenopathy.   Cardiovascular:  RRR normal S1/S2, no murmur.  Respiratory:  CTA B/L, normal respiratory effort.   Abdominal:   soft, no masses or tenderness, normoactive BS, NT/ND, no HSM.  Extremities:   No clubbing or cyanosis, normal capillary refill, no edema.   Skin:   No rash, jaundice, lesions, eczema.   Musculoskeletal:  No joint swelling, erythema or tenderness.   Neuro: No focal deficits.   Other:   _______________________________________________________________________________________________  Lab Results:                          12.9   2.66  )-----------( 160      ( 2021 10:59 )             40.4         138  |  98  |  52<H>  ----------------------------<  265<H>  3.5   |  17<L>  |  2.27<H>    Ca    7.8<L>      2021 10:59  Phos  4.9       Mg     2.1         TPro  4.6<L>  /  Alb  1.1<L>  /  TBili  1.7<H>  /  DBili  x   /  AST  264<H>  /  ALT  123<H>  /  AlkPhos  140<H>      LIVER FUNCTIONS - ( 2021 10:59 )  Alb: 1.1 g/dL / Pro: 4.6 g/dL / ALK PHOS: 140 U/L / ALT: 123 U/L DA / AST: 264 U/L / GGT: x           PT/INR - ( 2021 10:59 )   PT: 18.1 sec;   INR: 1.55 ratio         PTT - ( 2021 10:59 )  PTT:30.5 sec  C-Reactive Protein, Serum: 264 mg/L ( @ 09:07)  Triglycerides, Serum: 133 mg/dL ( @ 04:16)    CARDIAC MARKERS ( 2021 17:58 )  <0.015 ng/mL / x     / 53 U/L / x     / x          Stool Results:  Culture Results:   Growth in aerobic and anaerobic bottles: Gram Variable Rods  ***Blood Panel PCR results on this specimen are available  approximately 3 hours after the Gram stain result.***  Gram stain, PCR, and/or culture results may not always  correspond due todifference in methodologies.  ************************************************************  This PCR assay was performed by multiplex PCR. This  Assay tests for 66 bacterial and resistance gene targets.  Please refer to the [facility] [employer] testdirectory  at [email or web address] for details. ( @ 22:16)  Culture Results:   Growth in aerobic and anaerobic bottles: Gram Variable Rods ( @ 22:00)     22:16  Stool Culture --  Results   Growth in aerobic and anaerobic bottles: Gram Variable Rods  ***Blood Panel PCR results on this specimen are available  approximately 3 hours after the Gram stain result.***  Gram stain, PCR, and/or culture results may not always  correspond due todifference in methodologies.  ************************************************************  This PCR assay was performed by multiplex PCR. This  Assay tests for 66 bacterial and resistance gene targets.  Please refer to the [facility] [employer] testdirectory  at [email or web address] for details.  Organism Blood Culture PCR Blood Culture PCR    O&P  --   @ 22:00  Stool Culture --  Results   Growth in aerobic and anaerobic bottles: Gram Variable Rods  Organism -- --    O&P  --        RADIOLOGY RESULTS:    SURGICAL PATHOLOGY:

## 2021-07-23 NOTE — PROCEDURE NOTE - PLAN
-Follow up culture results.  -Surgical evaluation recommended - liver lesion likely resulted from infarction/necrotic tissue, and won't drain adequately.  -Continued management as per the ICU.    Case d/w the ICU fellow Dr. Edwards and with Dr. Campbell.

## 2021-07-23 NOTE — CONSULT NOTE ADULT - ASSESSMENT
Right hepatic lobe abscess  Septic shock  RSV  Bacteremia - Klebsiella   Respiratory failure - s/p intubation    Plan: Continue zosyn 3.375g iv q8 Right hepatic lobe abscess  Septic shock  RSV  Bacteremia - Klebsiella   Respiratory failure - s/p intubation    Plan: Continue zosyn 3.375g iv q8  prognosis is very poor.

## 2021-07-23 NOTE — CONSULT NOTE ADULT - SUBJECTIVE AND OBJECTIVE BOX
This is a 64 y/o male that came in to the Emergency Room  from home.  He ambulates independently, and  lives with family.  He has no significant PMH but came in complaining of abdominal pain and diarrhea for the past 10 days, along with nausea, dizziness, low grade fever, profuse sweating, loss of appetite, weight loss, dry cough, shortness of breath, not eating, and weight loss.   He received the Moderna Covid vaccine in April, but also complained of flu like symptoms a few days back. He denied any recent travel or sick contacts. He recently went to the ER a few days ago for abdominal pain, but admits to not seeing his PMD for the past 10 years.      A Cat Scan done on admission showed an ill defined low attenuation lesion in the right hepatic lobe with new foci of air.  This is concern for an abscess or a superinfection of an underlying liver mass       On admission he was found electrolyte abnormalities and worsening of his kidney function since the ER visit (Cr. 2.05 from 0.98). His lactate level is 11.6 today, but remains hypothermic and with a leukocyte count of 3.8 (today 2.66)    He was also found to have RSV.  He has since been intubated.    PAST MEDICAL & SURGICAL HISTORY:  No pertinent past medical history    MEDICATIONS  (STANDING):  chlorhexidine 2% Cloths 1 Application(s) Topical daily  dextrose 5%. 1000 milliLiter(s) (100 mL/Hr) IV Continuous <Continuous>  glucagon  Injectable 1 milliGRAM(s) IntraMuscular once  heparin   Injectable 5000 Unit(s) SubCutaneous every 12 hours  insulin lispro (ADMELOG) corrective regimen sliding scale   SubCutaneous every 6 hours  insulin regular Infusion 5 Unit(s)/Hr (5 mL/Hr) IV Continuous <Continuous>  norepinephrine Infusion 0.05 MICROgram(s)/kG/Min (3.97 mL/Hr) IV Continuous <Continuous>  pantoprazole  Injectable 40 milliGRAM(s) IV Push daily  phenylephrine    Infusion 0.3 MICROgram(s)/kG/Min (4.76 mL/Hr) IV Continuous <Continuous>  piperacillin/tazobactam IVPB.. 3.375 Gram(s) IV Intermittent every 8 hours  propofol Infusion 10 MICROgram(s)/kG/Min (5.08 mL/Hr) IV Continuous <Continuous>  succinylcholine Injectable 70 milliGRAM(s) IV Push once    MEDICATIONS  (PRN):  traMADol 50 milliGRAM(s) Oral every 6 hours PRN Moderate Pain (4 - 6)    Vital Signs Last 24 Hrs  T(C): 34.5 (23 Jul 2021 08:00), Max: 37.1 (22 Jul 2021 22:45)  T(F): 94.1 (23 Jul 2021 08:00), Max: 98.7 (22 Jul 2021 22:45)  HR: 119 (23 Jul 2021 11:06) (96 - 120)  BP: 126/74 (23 Jul 2021 11:00) (65/38 - 160/84)  BP(mean): 85 (23 Jul 2021 11:00) (42 - 101)  RR: 14 (23 Jul 2021 11:00) (14 - 42)  SpO2: 99% (23 Jul 2021 11:06) (89% - 99%)                          12.9   2.66  )-----------( 160      ( 23 Jul 2021 10:59 )             40.4   07-23    138  |  98  |  52<H>  ----------------------------<  265<H>  3.5   |  17<L>  |  2.27<H>    Ca    7.8<L>      23 Jul 2021 10:59  Phos  4.9     07-23  Mg     2.1     07-23    TPro  4.6<L>  /  Alb  1.1<L>  /  TBili  1.7<H>  /  DBili  x   /  AST  264<H>  /  ALT  123<H>  /  AlkPhos  140<H>  07-23      PT/INR - ( 23 Jul 2021 10:59 )   PT: 18.1 sec;   INR: 1.55 ratio    PTT - ( 23 Jul 2021 10:59 )  PTT:30.5 sec      CT Abdomen and Pelvis No Cont (07.22.21 @ 21:32) >  IMPRESSION:  Numerous new nodules and nodular masslike consolidations in the lung bases, several of which demonstrate central cavitation. Given rapid development of these lesions, primary concern is for septic emboli. Small bilateral pleural effusions.    Nodularity of the surface contour of the liver concerning first row cyst. Ill-defined low-attenuation lesion in the right hepatic lobe as seen on CT of the chest measuring up to 9.0 x 6.2 cm with new foci of air which may represent abscess or superinfection of underlying liver mass. Liver protocol MRI is recommended for further evaluation.    Small volume of abdominal and pelvic ascites.    No bowel obstruction. Evaluation of bowel wall limited by [intravenous contrast. Apparent thickening of segments of the large bowel including the cecum is likely due to underdistention.    --- End of Report ---    < end of copied text >

## 2021-07-23 NOTE — PROCEDURE NOTE - NSPROCDETAILS_GEN_ALL_CORE
guidewire recovered/lumen(s) aspirated and flushed/sterile dressing applied/sterile technique, catheter placed/ultrasound guidance with use of sterile gel and probe cove
location identified, draped/prepped, sterile technique used, needle inserted/introduced/positive blood return obtained via catheter/connected to a pressurized flush line/sutured in place

## 2021-07-23 NOTE — CHART NOTE - NSCHARTNOTEFT_GEN_A_CORE
Spoke with the patient's brother, Marquez, about goal of care. Marquez has been updated on his brother's status and poor prognosis. Marquez still wants to remain full code for his brother.

## 2021-07-23 NOTE — CONSULT NOTE ADULT - ASSESSMENT
64 yo male currently intubated in the ICU with a questionable liver abscess.  64 yo male currently intubated in the ICU with a questionable liver abscess. Abdominal CT with contrast obtained, confirming liver abscess.

## 2021-07-24 NOTE — PROGRESS NOTE ADULT - ASSESSMENT
Patient is a 66 y/o male from home, ambulates independently, lives with family with no significant PMH who presents to the ED with a chief complaint of abdominal pain and diarrhea x 10 days. Admitted to ICU for Sepsis due to sep    AHRF   Septic shock  Lactic acidosis  Septic emboli  Liver Abscess  Hyponatremia  High anion gap metabolic acidosis  NANCY  Hyperglycemia  Transaminitis    CNS    # AAO x 3 from baseline  Intubated and sedated   On propofol     =====================[CVS ]===============================  # EKG showed Sinus tachycardia.  f/u ECHO    =====================[RESP ]==============================  # Acute hypoxic hypercapnic respiratory failure sec to septic shock and lactic acidosis  Intubated   Abx with Zosyn   CT Chest with lung nodularities b/l lower lung bases likely septic emboli as compare to prior CT on 7/19      =====================[ GI ]================================  # Transaminitis LFTs are elevated, likely secondary to Liver abscess  - Continue to trend CMP   acute hepatiits panel neg  CT Abdomen showed Numerous new nodules and nodular masslike consolidations in the lung bases, several of which demonstrate central cavitation. There is concern for septic emboli. Small bilateral pleural effusions.  Nodularity of the surface contour of the liver concerning first row cyst.       #Liver abscess  IR guided liver abscess drainage with the tube draining fluid  Culture sent   CT A/P with contrast showed  collection of air and mottled soft tissue, either abscess or necrosis/infarction. The process has extended through the capsule resulting pneumoperitoneum. Consider etiologies include infarction and/or infection. Occluded middle hepatic vein. Thick-walled jejunum, either infectious or ischemic.  Consulted Surgery , no intervention, pt unstable     #No tube feeds for bowel ischemia       ====================[ RENAL ]=============================   # NANCY /ATN sec to septic shock  BUN/ Cr is 40/2.05 and now trending up  Hyperphosphatemia   Nephrology consulted      Hyponatremia   Sodium of 128  Corrected sodium is 133 for hyperglycemia    Anion gap metabolic acidosis  Lactate high 11>14 , LFTs are high too   S/P 4.5lit  S/P 6 bicarb pushes   On bicarb drip    #Anuric   Not making urine   No HD now   Likely need HD in near future       =====================[ ID ]================================  Sepsis with Leucocytosis of 16k, lactate of 10.9 >11>14  s/p 4.5l  s/p zosyn and vancomycin in ED   U/A mildly positive,   Bcx with Klebsiella    started on Zosyn, continue with Zosyn   f/u culture from liver abscess  ID Dr Cardoso consulted    ===================[ HEME/ONC ]===========================  # no issues  =====================[ ENDO ]=============================  # Uncontrolled hyperglycemia  -Anion Gap of 19 due to Lactic acidosis, Blood glucose of 415  Started on insulin drip but discontinued   Monitor Blood glucose every 6hrs as the pt is npo  ISS for now  f/u Hba1c     MUSCULOSKELETAL   no issues    SKIN  # no issues    ==================[ PROPHYLAXIS ]==========================   # Dvt : no dvt ppx due to IR procedure   # Gi : Protonix     ====================[ DISPO ]==============================   - Monitor in ICU   GOC Full code

## 2021-07-24 NOTE — PROGRESS NOTE ADULT - SUBJECTIVE AND OBJECTIVE BOX
INTERVAL HPI/OVERNIGHT EVENTS: remains to have poor prognosis.    ROS  - intubated and sedated    Antimicrobial:  piperacillin/tazobactam IVPB.. 3.375 Gram(s) IV Intermittent every 8 hours    Cardiovascular:  norepinephrine Infusion 2.7 MICROgram(s)/kG/Min IV Continuous <Continuous>    Pulmonary:    Hematalogic:    Other:  chlorhexidine 2% Cloths 1 Application(s) Topical daily  dextrose 5%. 1000 milliLiter(s) IV Continuous <Continuous>  glucagon  Injectable 1 milliGRAM(s) IntraMuscular once  hydrocortisone sodium succinate Injectable 100 milliGRAM(s) IV Push every 8 hours  insulin lispro (ADMELOG) corrective regimen sliding scale   SubCutaneous every 6 hours  pantoprazole  Injectable 40 milliGRAM(s) IV Push daily  propofol Infusion 10 MICROgram(s)/kG/Min IV Continuous <Continuous>  sodium bicarbonate  Infusion 0.222 mEq/kG/Hr IV Continuous <Continuous>  vasopressin Infusion 0.04 Unit(s)/Min IV Continuous <Continuous>    chlorhexidine 2% Cloths 1 Application(s) Topical daily  dextrose 5%. 1000 milliLiter(s) IV Continuous <Continuous>  glucagon  Injectable 1 milliGRAM(s) IntraMuscular once  hydrocortisone sodium succinate Injectable 100 milliGRAM(s) IV Push every 8 hours  insulin lispro (ADMELOG) corrective regimen sliding scale   SubCutaneous every 6 hours  norepinephrine Infusion 2.7 MICROgram(s)/kG/Min IV Continuous <Continuous>  pantoprazole  Injectable 40 milliGRAM(s) IV Push daily  piperacillin/tazobactam IVPB.. 3.375 Gram(s) IV Intermittent every 8 hours  propofol Infusion 10 MICROgram(s)/kG/Min IV Continuous <Continuous>  sodium bicarbonate  Infusion 0.222 mEq/kG/Hr IV Continuous <Continuous>  vasopressin Infusion 0.04 Unit(s)/Min IV Continuous <Continuous>    Drug Dosing Weight  Height (cm): 185.4 (2021 17:35)  Weight (kg): 84.6 (2021 03:00)  BMI (kg/m2): 24.6 (2021 03:00)  BSA (m2): 2.09 (2021 03:00)    ICU Vital Signs Last 24 Hrs  T(C): 37.8 (2021 22:38), Max: 38.3 (2021 20:19)  T(F): 100.1 (2021 22:38), Max: 100.9 (2021 20:19)  HR: 129 (2021 01:45) (96 - 143)  BP: 89/70 (2021 22:00) (65/38 - 160/84)  BP(mean): 74 (2021 22:00) (42 - 101)  ABP: 116/69 (2021 01:45) (78/53 - 192/77)  ABP(mean): 81 (2021 01:45) (58 - 107)  RR: 34 (2021 01:45) (14 - 42)  SpO2: 100% (2021 01:45) (81% - 100%)      ABG - ( 2021 21:37 )  pH, Arterial: <7.0  pH, Blood: x     /  pCO2: 37    /  pO2: 73    / HCO3: uncalc / Base Excess: uncalc /  SaO2: 93                    07-22 @ 07:01  -  07-23 @ 07:00  --------------------------------------------------------  IN: 1500 mL / OUT: 0 mL / NET: 1500 mL        Mode: AC/ CMV (Assist Control/ Continuous Mandatory Ventilation)  RR (machine): 34  TV (machine): 500  FiO2: 100  PEEP: 10  ITime: 1  MAP: 18  PIP: 34        PHYSICAL EXAM:    GENERAL: sedated and intubated  EYES: EOMI, PERRLA,   NECK: Supple, No JVD; Normal thyroid; Trachea midline  CHEST/LUNG: No rales, rhonchi, wheezing   HEART: Regular rate and rhythm; No murmurs,   ABDOMEN: Soft, Nontender, Nondistended; Bowel sounds present  EXTREMITIES:  2+ Peripheral Pulses, No clubbing, cyanosis, or edema    LABS:  CBC Full  -  ( 2021 19:51 )  WBC Count : 4.56 K/uL  RBC Count : 5.31 M/uL  Hemoglobin : 14.7 g/dL  Hematocrit : 48.4 %  Platelet Count - Automated : 140 K/uL  Mean Cell Volume : 91.1 fl  Mean Cell Hemoglobin : 27.7 pg  Mean Cell Hemoglobin Concentration : 30.4 gm/dL  Auto Neutrophil # : x  Auto Lymphocyte # : x  Auto Monocyte # : x  Auto Eosinophil # : x  Auto Basophil # : x  Auto Neutrophil % : x  Auto Lymphocyte % : x  Auto Monocyte % : x  Auto Eosinophil % : x  Auto Basophil % : x        138  |  98  |  57<H>  ----------------------------<  104<H>  4.4   |  11<L>  |  3.15<H>    Ca    7.5<L>      2021 19:51  Phos  8.7       Mg     2.7         TPro  4.8<L>  /  Alb  1.1<L>  /  TBili  1.8<H>  /  DBili  x   /  AST  1872<H>  /  ALT  417<H>  /  AlkPhos  190<H>      PT/INR - ( 2021 10:59 )   PT: 18.1 sec;   INR: 1.55 ratio         PTT - ( 2021 10:59 )  PTT:30.5 sec  Urinalysis Basic - ( 2021 21:41 )    Color: Agata / Appearance: Clear / S.025 / pH: x  Gluc: x / Ketone: Trace  / Bili: Moderate / Urobili: 4   Blood: x / Protein: 30 mg/dL / Nitrite: Positive   Leuk Esterase: Trace / RBC: 2-5 /HPF / WBC 6-10 /HPF   Sq Epi: x / Non Sq Epi: Few /HPF / Bacteria: TNTC /HPF      Culture Results:   Growth in aerobic and anaerobic bottles: Gram Variable Rods  ***Blood Panel PCR results on this specimen are available  approximately 3 hours after the Gram stain result.***  Gram stain, PCR, and/or culture results may not always  correspond due todifference in methodologies.  ************************************************************  This PCR assay was performed by multiplex PCR. This  Assay tests for 66 bacterial and resistance gene targets.  Please refer to the [facility] testdirectory  at [email or web address] for details. ( @ 22:16)  Culture Results:   Growth in aerobic and anaerobic bottles: Gram Variable Rods ( @ 22:00)      RADIOLOGY & ADDITIONAL STUDIES REVIEWED:  Yes    CRITICAL CARE TIME SPENT: 35 minutes INTERVAL HPI/OVERNIGHT EVENTS: remains to have poor prognosis.    ROS  - intubated and sedated    Antimicrobial:  piperacillin/tazobactam IVPB.. 3.375 Gram(s) IV Intermittent every 8 hours    Cardiovascular:  norepinephrine Infusion 2.7 MICROgram(s)/kG/Min IV Continuous <Continuous>    Pulmonary:    Hematalogic:    Other:  chlorhexidine 2% Cloths 1 Application(s) Topical daily  dextrose 5%. 1000 milliLiter(s) IV Continuous <Continuous>  glucagon  Injectable 1 milliGRAM(s) IntraMuscular once  hydrocortisone sodium succinate Injectable 100 milliGRAM(s) IV Push every 8 hours  insulin lispro (ADMELOG) corrective regimen sliding scale   SubCutaneous every 6 hours  pantoprazole  Injectable 40 milliGRAM(s) IV Push daily  propofol Infusion 10 MICROgram(s)/kG/Min IV Continuous <Continuous>  sodium bicarbonate  Infusion 0.222 mEq/kG/Hr IV Continuous <Continuous>  vasopressin Infusion 0.04 Unit(s)/Min IV Continuous <Continuous>    chlorhexidine 2% Cloths 1 Application(s) Topical daily  dextrose 5%. 1000 milliLiter(s) IV Continuous <Continuous>  glucagon  Injectable 1 milliGRAM(s) IntraMuscular once  hydrocortisone sodium succinate Injectable 100 milliGRAM(s) IV Push every 8 hours  insulin lispro (ADMELOG) corrective regimen sliding scale   SubCutaneous every 6 hours  norepinephrine Infusion 2.7 MICROgram(s)/kG/Min IV Continuous <Continuous>  pantoprazole  Injectable 40 milliGRAM(s) IV Push daily  piperacillin/tazobactam IVPB.. 3.375 Gram(s) IV Intermittent every 8 hours  propofol Infusion 10 MICROgram(s)/kG/Min IV Continuous <Continuous>  sodium bicarbonate  Infusion 0.222 mEq/kG/Hr IV Continuous <Continuous>  vasopressin Infusion 0.04 Unit(s)/Min IV Continuous <Continuous>    Drug Dosing Weight  Height (cm): 185.4 (2021 17:35)  Weight (kg): 84.6 (2021 03:00)  BMI (kg/m2): 24.6 (2021 03:00)  BSA (m2): 2.09 (2021 03:00)    ICU Vital Signs Last 24 Hrs  T(C): 37.8 (2021 22:38), Max: 38.3 (2021 20:19)  T(F): 100.1 (2021 22:38), Max: 100.9 (2021 20:19)  HR: 129 (2021 01:45) (96 - 143)  BP: 89/70 (2021 22:00) (65/38 - 160/84)  BP(mean): 74 (2021 22:00) (42 - 101)  ABP: 116/69 (2021 01:45) (78/53 - 192/77)  ABP(mean): 81 (2021 01:45) (58 - 107)  RR: 34 (2021 01:45) (14 - 42)  SpO2: 100% (2021 01:45) (81% - 100%)      ABG - ( 2021 21:37 )  pH, Arterial: <7.0  pH, Blood: x     /  pCO2: 37    /  pO2: 73    / HCO3: uncalc / Base Excess: uncalc /  SaO2: 93                    07-22 @ 07:01  -  07-23 @ 07:00  --------------------------------------------------------  IN: 1500 mL / OUT: 0 mL / NET: 1500 mL        Mode: AC/ CMV (Assist Control/ Continuous Mandatory Ventilation)  RR (machine): 34  TV (machine): 500  FiO2: 100  PEEP: 10  ITime: 1  MAP: 18  PIP: 34        PHYSICAL EXAM:    GENERAL: sedated and intubated  EYES: EOMI, PERRLA,   NECK: Supple, No JVD; Normal thyroid; Trachea midline  CHEST/LUNG: No rales, rhonchi, wheezing   HEART: Regular rate and rhythm; No murmurs,   ABDOMEN: Soft, Nontender, Nondistended; Bowel sounds present  EXTREMITIES:  2+ Peripheral Pulses, No clubbing, cyanosis, or edema  CNS: sedated     LABS:  CBC Full  -  ( 2021 19:51 )  WBC Count : 4.56 K/uL  RBC Count : 5.31 M/uL  Hemoglobin : 14.7 g/dL  Hematocrit : 48.4 %  Platelet Count - Automated : 140 K/uL  Mean Cell Volume : 91.1 fl  Mean Cell Hemoglobin : 27.7 pg  Mean Cell Hemoglobin Concentration : 30.4 gm/dL  Auto Neutrophil # : x  Auto Lymphocyte # : x  Auto Monocyte # : x  Auto Eosinophil # : x  Auto Basophil # : x  Auto Neutrophil % : x  Auto Lymphocyte % : x  Auto Monocyte % : x  Auto Eosinophil % : x  Auto Basophil % : x        138  |  98  |  57<H>  ----------------------------<  104<H>  4.4   |  11<L>  |  3.15<H>    Ca    7.5<L>      2021 19:51  Phos  8.7       Mg     2.7         TPro  4.8<L>  /  Alb  1.1<L>  /  TBili  1.8<H>  /  DBili  x   /  AST  1872<H>  /  ALT  417<H>  /  AlkPhos  190<H>      PT/INR - ( 2021 10:59 )   PT: 18.1 sec;   INR: 1.55 ratio         PTT - ( 2021 10:59 )  PTT:30.5 sec  Urinalysis Basic - ( 2021 21:41 )    Color: Agata / Appearance: Clear / S.025 / pH: x  Gluc: x / Ketone: Trace  / Bili: Moderate / Urobili: 4   Blood: x / Protein: 30 mg/dL / Nitrite: Positive   Leuk Esterase: Trace / RBC: 2-5 /HPF / WBC 6-10 /HPF   Sq Epi: x / Non Sq Epi: Few /HPF / Bacteria: TNTC /HPF      Culture Results:   Growth in aerobic and anaerobic bottles: Gram Variable Rods  ***Blood Panel PCR results on this specimen are available  approximately 3 hours after the Gram stain result.***  Gram stain, PCR, and/or culture results may not always  correspond due todifference in methodologies.  ************************************************************  This PCR assay was performed by multiplex PCR. This  Assay tests for 66 bacterial and resistance gene targets.  Please refer to the [facility] testdirectory  at [email or web address] for details. ( @ 22:16)  Culture Results:   Growth in aerobic and anaerobic bottles: Gram Variable Rods ( @ 22:00)      RADIOLOGY & ADDITIONAL STUDIES REVIEWED:  Yes    CRITICAL CARE TIME SPENT: 35 minutes

## 2021-07-24 NOTE — PROGRESS NOTE ADULT - ATTENDING COMMENTS
IMP: This is a  65 yr old man  from home, ambulates independently, lives with family with no significant PMH who presents to the ED with a chief complaint of abdominal pain and diarrhea x 10 days. Admitted to ICU for Sepsis due to sep          Assessment:    -  Septic shock  -  Klebsiella Bacteremia   -  Acute Hypoxic respiratory failure  -  Lactic acidosis with high anion gap metabolic acidosis   -  Acute renal failure  -  Liver abscess  -  Multiple pulmonary nodules - suspect infectious emboli  -  Shock liver    -  RSV        Plan  - Patient urgently intubated and placed on mechanical ventilation  - Sedation for vent synchrony   - Broad spectrum antibiotics  - Follow up cultures  - ID consult  - Sodium bicarb infusion  - Nephrology consult for HD today  - IR consulted, requesting CT scan with IV contrast to better define liver collection, may need drainage   - Monitor hemodynamics, will insert arterial line   - Vasopressor support to maintain MAP > 65%   - Repeat labs in PM  - DVT and stress ulcer prophylaxis  - Overall prognosis is guarded given multiple system organ dysfunction.

## 2021-07-24 NOTE — DISCHARGE NOTE FOR THE EXPIRED PATIENT - HOSPITAL COURSE
Patient is a 66 y/o male from home, ambulates independently, lives with family with no significant PMH who presents to the ED with a chief complaint of abdominal pain and diarrhea x 10 days. Admitted to ICU for Sepsis. CT of the abdomen which showed multiple nodular lesions of the lung base with cavitation ( concerning for septic emboli ) and a low attenuation lesion of the liver. Pt was intubated on 7/23. He was sent to IR for drainage of liver abscess.       Patient is a 64 y/o male from home, ambulates independently, lives with family with no significant PMH who presents to the ED with a chief complaint of abdominal pain and diarrhea x 10 days. Admitted to ICU for Sepsis. CT of the abdomen which showed multiple nodular lesions of the lung base with cavitation ( concerning for septic emboli ) and a low attenuation lesion of the liver. Pt was intubated on 7/23. He was sent to IR for drainage of liver abscess. Patient had progressive worsening of septic shock requiring multiple pressor support. He was started on broad spectrum antibiotics.  Went into multi organ failure and was not deemed a candidate for dialysis due to shock .Patient went into asystole at 8.45 Am, CPR was initiated and he received 3 epinephrine,, 2 bicarb push and d 50 pushes, He was pronounced at 8.57 Am. Family informed and condolences provided.    Patient is a 66 y/o male from home, ambulates independently, lives with family with no significant PMH who presents to the ED with a chief complaint of abdominal pain and diarrhea x 10 days. Admitted to ICU for Sepsis. CT of the abdomen which showed multiple nodular lesions of the lung base with cavitation ( concerning for septic emboli ) and a low attenuation lesion of the liver. Pt was intubated on 7/23. He was sent to IR for drainage of liver abscess. Patient had progressive worsening of septic shock requiring multiple pressor support. He was started on broad spectrum antibiotics.  Went into multi organ failure and was not deemed a candidate for dialysis due to shock .Patient went into asystole at 8.45 Am, CPR was initiated and he received 3 epinephrine,, 2 bicarb push and d 50 pushes, He was pronounced at 8.57 Am. Family informed and condolences provided.   for a full account of hospital course please refer to actual medical records for this is a brief summery

## 2021-07-25 LAB
-  AMIKACIN: SIGNIFICANT CHANGE UP
-  AMOXICILLIN/CLAVULANIC ACID: SIGNIFICANT CHANGE UP
-  AMOXICILLIN/CLAVULANIC ACID: SIGNIFICANT CHANGE UP
-  AMPICILLIN/SULBACTAM: SIGNIFICANT CHANGE UP
-  AMPICILLIN: SIGNIFICANT CHANGE UP
-  AZTREONAM: SIGNIFICANT CHANGE UP
-  CEFAZOLIN: SIGNIFICANT CHANGE UP
-  CEFEPIME: SIGNIFICANT CHANGE UP
-  CEFOXITIN: SIGNIFICANT CHANGE UP
-  CEFTRIAXONE: SIGNIFICANT CHANGE UP
-  CIPROFLOXACIN: SIGNIFICANT CHANGE UP
-  ERTAPENEM: SIGNIFICANT CHANGE UP
-  GENTAMICIN: SIGNIFICANT CHANGE UP
-  IMIPENEM: SIGNIFICANT CHANGE UP
-  LEVOFLOXACIN: SIGNIFICANT CHANGE UP
-  MEROPENEM: SIGNIFICANT CHANGE UP
-  NITROFURANTOIN: SIGNIFICANT CHANGE UP
-  PIPERACILLIN/TAZOBACTAM: SIGNIFICANT CHANGE UP
-  TIGECYCLINE: SIGNIFICANT CHANGE UP
-  TOBRAMYCIN: SIGNIFICANT CHANGE UP
-  TRIMETHOPRIM/SULFAMETHOXAZOLE: SIGNIFICANT CHANGE UP
CULTURE RESULTS: SIGNIFICANT CHANGE UP
METHOD TYPE: SIGNIFICANT CHANGE UP
ORGANISM # SPEC MICROSCOPIC CNT: SIGNIFICANT CHANGE UP
SPECIMEN SOURCE: SIGNIFICANT CHANGE UP

## 2021-07-27 LAB — HIV 2 PROVIRAL DNA SERPL QL NAA+PROBE: SIGNIFICANT CHANGE UP

## 2021-07-28 LAB
CULTURE RESULTS: SIGNIFICANT CHANGE UP
ORGANISM # SPEC MICROSCOPIC CNT: SIGNIFICANT CHANGE UP
ORGANISM # SPEC MICROSCOPIC CNT: SIGNIFICANT CHANGE UP
SPECIMEN SOURCE: SIGNIFICANT CHANGE UP